# Patient Record
Sex: FEMALE | Race: WHITE | NOT HISPANIC OR LATINO | Employment: OTHER | URBAN - METROPOLITAN AREA
[De-identification: names, ages, dates, MRNs, and addresses within clinical notes are randomized per-mention and may not be internally consistent; named-entity substitution may affect disease eponyms.]

---

## 2019-01-05 ENCOUNTER — HOSPITAL ENCOUNTER (EMERGENCY)
Facility: HOSPITAL | Age: 29
Discharge: HOME | End: 2019-01-05
Attending: EMERGENCY MEDICINE
Payer: COMMERCIAL

## 2019-01-05 VITALS
HEART RATE: 95 BPM | RESPIRATION RATE: 22 BRPM | SYSTOLIC BLOOD PRESSURE: 132 MMHG | HEIGHT: 67 IN | TEMPERATURE: 99 F | OXYGEN SATURATION: 99 % | DIASTOLIC BLOOD PRESSURE: 83 MMHG | WEIGHT: 230 LBS | BODY MASS INDEX: 36.1 KG/M2

## 2019-01-05 DIAGNOSIS — G43.109 COMPLICATED MIGRAINE: Primary | ICD-10-CM

## 2019-01-05 PROBLEM — R20.0 NUMBNESS: Status: ACTIVE | Noted: 2019-01-05

## 2019-01-05 LAB
ALBUMIN SERPL BCP-MCNC: 4.1 G/DL
ALP SERPL-CCNC: 60 U/L
ALT SERPL W/O P-5'-P-CCNC: 67 U/L
ANION GAP SERPL CALC-SCNC: 10 MMOL/L
AST SERPL-CCNC: 42 U/L
B-HCG UR QL: NEGATIVE
BASOPHILS # BLD AUTO: 0.05 K/UL
BASOPHILS NFR BLD: 0.6 %
BILIRUB SERPL-MCNC: 0.8 MG/DL
BUN SERPL-MCNC: 13 MG/DL
CALCIUM SERPL-MCNC: 9.7 MG/DL
CHLORIDE SERPL-SCNC: 104 MMOL/L
CHOLEST SERPL-MCNC: 221 MG/DL
CHOLEST/HDLC SERPL: 4 {RATIO}
CO2 SERPL-SCNC: 22 MMOL/L
CREAT SERPL-MCNC: 0.9 MG/DL
CTP QC/QA: YES
DIFFERENTIAL METHOD: ABNORMAL
EOSINOPHIL # BLD AUTO: 0.2 K/UL
EOSINOPHIL NFR BLD: 2.8 %
ERYTHROCYTE [DISTWIDTH] IN BLOOD BY AUTOMATED COUNT: 12.6 %
EST. GFR  (AFRICAN AMERICAN): >60 ML/MIN/1.73 M^2
EST. GFR  (NON AFRICAN AMERICAN): >60 ML/MIN/1.73 M^2
GLUCOSE SERPL-MCNC: 94 MG/DL
HCT VFR BLD AUTO: 45.1 %
HDLC SERPL-MCNC: 55 MG/DL
HDLC SERPL: 24.9 %
HGB BLD-MCNC: 15.4 G/DL
IMM GRANULOCYTES # BLD AUTO: 0.02 K/UL
IMM GRANULOCYTES NFR BLD AUTO: 0.2 %
INR PPP: 1
LDLC SERPL CALC-MCNC: 141.8 MG/DL
LYMPHOCYTES # BLD AUTO: 3.4 K/UL
LYMPHOCYTES NFR BLD: 40.3 %
MCH RBC QN AUTO: 31.3 PG
MCHC RBC AUTO-ENTMCNC: 34.1 G/DL
MCV RBC AUTO: 92 FL
MONOCYTES # BLD AUTO: 0.8 K/UL
MONOCYTES NFR BLD: 9.1 %
NEUTROPHILS # BLD AUTO: 4 K/UL
NEUTROPHILS NFR BLD: 47 %
NONHDLC SERPL-MCNC: 166 MG/DL
NRBC BLD-RTO: 0 /100 WBC
PLATELET # BLD AUTO: 261 K/UL
PMV BLD AUTO: 10.4 FL
POCT GLUCOSE: 103 MG/DL (ref 70–110)
POTASSIUM SERPL-SCNC: 3.7 MMOL/L
PROT SERPL-MCNC: 7.9 G/DL
PROTHROMBIN TIME: 10.2 SEC
RBC # BLD AUTO: 4.92 M/UL
SODIUM SERPL-SCNC: 136 MMOL/L
TRIGL SERPL-MCNC: 121 MG/DL
TSH SERPL DL<=0.005 MIU/L-ACNC: 3.37 UIU/ML
WBC # BLD AUTO: 8.49 K/UL

## 2019-01-05 PROCEDURE — 81025 URINE PREGNANCY TEST: CPT | Performed by: EMERGENCY MEDICINE

## 2019-01-05 PROCEDURE — 80061 LIPID PANEL: CPT

## 2019-01-05 PROCEDURE — 99285 EMERGENCY DEPT VISIT HI MDM: CPT | Mod: 25

## 2019-01-05 PROCEDURE — 85025 COMPLETE CBC W/AUTO DIFF WBC: CPT

## 2019-01-05 PROCEDURE — 85610 PROTHROMBIN TIME: CPT

## 2019-01-05 PROCEDURE — 99285 PR EMERGENCY DEPT VISIT,LEVEL V: ICD-10-PCS | Mod: ,,, | Performed by: EMERGENCY MEDICINE

## 2019-01-05 PROCEDURE — 63600175 PHARM REV CODE 636 W HCPCS: Performed by: EMERGENCY MEDICINE

## 2019-01-05 PROCEDURE — 99285 EMERGENCY DEPT VISIT HI MDM: CPT | Mod: ,,, | Performed by: EMERGENCY MEDICINE

## 2019-01-05 PROCEDURE — 96375 TX/PRO/DX INJ NEW DRUG ADDON: CPT

## 2019-01-05 PROCEDURE — 99284 EMERGENCY DEPT VISIT MOD MDM: CPT | Mod: ,,, | Performed by: PSYCHIATRY & NEUROLOGY

## 2019-01-05 PROCEDURE — 96374 THER/PROPH/DIAG INJ IV PUSH: CPT

## 2019-01-05 PROCEDURE — 82962 GLUCOSE BLOOD TEST: CPT

## 2019-01-05 PROCEDURE — 93010 ELECTROCARDIOGRAM REPORT: CPT | Mod: ,,, | Performed by: INTERNAL MEDICINE

## 2019-01-05 PROCEDURE — 93010 EKG 12-LEAD: ICD-10-PCS | Mod: ,,, | Performed by: INTERNAL MEDICINE

## 2019-01-05 PROCEDURE — 82565 ASSAY OF CREATININE: CPT

## 2019-01-05 PROCEDURE — 80053 COMPREHEN METABOLIC PANEL: CPT

## 2019-01-05 PROCEDURE — 84443 ASSAY THYROID STIM HORMONE: CPT

## 2019-01-05 PROCEDURE — 99284 PR EMERGENCY DEPT VISIT,LEVEL IV: ICD-10-PCS | Mod: ,,, | Performed by: PSYCHIATRY & NEUROLOGY

## 2019-01-05 PROCEDURE — 93005 ELECTROCARDIOGRAM TRACING: CPT

## 2019-01-05 PROCEDURE — 25000003 PHARM REV CODE 250: Performed by: EMERGENCY MEDICINE

## 2019-01-05 PROCEDURE — 99900035 HC TECH TIME PER 15 MIN (STAT)

## 2019-01-05 RX ORDER — ACETAMINOPHEN 325 MG/1
650 TABLET ORAL
Status: COMPLETED | OUTPATIENT
Start: 2019-01-05 | End: 2019-01-05

## 2019-01-05 RX ORDER — NAPROXEN 500 MG/1
500 TABLET ORAL 2 TIMES DAILY PRN
Qty: 14 TABLET | Refills: 0 | Status: SHIPPED | OUTPATIENT
Start: 2019-01-05 | End: 2019-01-12

## 2019-01-05 RX ORDER — ONDANSETRON 2 MG/ML
4 INJECTION INTRAMUSCULAR; INTRAVENOUS
Status: COMPLETED | OUTPATIENT
Start: 2019-01-05 | End: 2019-01-05

## 2019-01-05 RX ORDER — PROCHLORPERAZINE EDISYLATE 5 MG/ML
10 INJECTION INTRAMUSCULAR; INTRAVENOUS ONCE
Status: COMPLETED | OUTPATIENT
Start: 2019-01-05 | End: 2019-01-05

## 2019-01-05 RX ADMIN — SODIUM CHLORIDE 250 ML: 0.9 INJECTION, SOLUTION INTRAVENOUS at 01:01

## 2019-01-05 RX ADMIN — PROCHLORPERAZINE EDISYLATE 10 MG: 5 INJECTION INTRAMUSCULAR; INTRAVENOUS at 01:01

## 2019-01-05 RX ADMIN — ACETAMINOPHEN 650 MG: 325 TABLET ORAL at 02:01

## 2019-01-05 RX ADMIN — ONDANSETRON 4 MG: 2 INJECTION INTRAMUSCULAR; INTRAVENOUS at 02:01

## 2019-01-05 NOTE — ED NOTES
Report to dr arzate, no stroke code at this time, no weakness or drift to arms legs, face symmetrical

## 2019-01-05 NOTE — ED NOTES
Pt. To CT with RN on cardiac, o2, and bp monitor. Stuttering noted but otherwise a&ox4. States headache at this time, no blurred vision. No motor deficits, states numbness started on rue this afternoon but states it is now on lue. Tachypneic at 22breaths/min, otherwise vss and wnl.

## 2019-01-05 NOTE — ED NOTES
Pt. Transported back to ED bed 17 with RN on cardiac and o2 monitor. No acute distress noted at this time.

## 2019-01-05 NOTE — ED NOTES
Pt. Remains in MRI with RN and stroke PA on cardiac, o2, and bp monitor. Tolerating well. Will continue to monitor frequently.

## 2019-01-05 NOTE — ED PROVIDER NOTES
Encounter Date: 1/5/2019    SCRIBE #1 NOTE: I, Rylie Stokes, am scribing for, and in the presence of,  Dr. Andujar. I have scribed the following portions of the note - Other sections scribed: HPI, ROS, PE.       History     Chief Complaint   Patient presents with    Numbness     r hand numbness, rside of face,  now moved to my L side while in uber, crying, hx anxiety, headaches for 2 weeks, hx ptsd     Time patient was seen by the provider: 11:40 AM      Ms. Yi is a 28 y.o. female with history of anxiety, PTSD, asthma, colon polyps and depression who presents to the ED with a complaint of numbness. Patient reports right sided facial numbness, right hand numbness and dfficulty with speech onset 1 hour PTA, now with numbness in left hand. Friend reports she has had migraines with bending over and lying on her abdomen for the past several days. Has some associated nausea without vomiting. Has frontal and left sided headache that has been present for 1 week. No new stressors today. Denies fevers, chills, chest pain, SOB, abd pain.      The history is provided by the patient and medical records.     Review of patient's allergies indicates:   Allergen Reactions    Penicillins Hives     Past Medical History:   Diagnosis Date    Anxiety     Asthma     Colon polyps 2016    Depression     PTSD (post-traumatic stress disorder)      Past Surgical History:   Procedure Laterality Date    colonpolyp removal       No family history on file.  Social History     Tobacco Use    Smoking status: Not on file   Substance Use Topics    Alcohol use: Not on file    Drug use: Not on file     Review of Systems   Constitutional: Negative.    HENT: Negative.    Respiratory: Negative.    Cardiovascular: Negative.    Gastrointestinal: Negative.    Genitourinary: Negative.    Musculoskeletal: Negative.    Neurological: Positive for speech difficulty, numbness (hands b/l, right facial) and headaches.   All other systems  reviewed and are negative.      Physical Exam     Initial Vitals [01/05/19 1127]   BP Pulse Resp Temp SpO2   (!) 151/84 100 18 98.6 °F (37 °C) 99 %      MAP       --         Physical Exam    Nursing note and vitals reviewed.  Constitutional: She appears well-developed and well-nourished. She is not diaphoretic. No distress.   HENT:   Head: Normocephalic and atraumatic.   Right Ear: External ear normal.   Left Ear: External ear normal.   Nose: Nose normal.   Mouth/Throat: Oropharynx is clear and moist.   Eyes: Conjunctivae and EOM are normal. Pupils are equal, round, and reactive to light.   No nystagmus.   Neck: Neck supple.   Cardiovascular: Normal rate, regular rhythm, normal heart sounds and intact distal pulses.   Pulmonary/Chest: Breath sounds normal. No respiratory distress. She has no wheezes. She has no rhonchi. She has no rales.   Abdominal: Soft. She exhibits no distension. There is no tenderness.   Neurological: She is alert and oriented to person, place, and time. No cranial nerve deficit. GCS score is 15. GCS eye subscore is 4. GCS verbal subscore is 5. GCS motor subscore is 6.   Tremulous on exam. Normal BUE strength. Has wavering BLE legs but normal tone. Distal sensation intact to light touch. Has stuttered speech with possible expressive aphasia.   Skin: Skin is warm. Capillary refill takes less than 2 seconds. No rash noted.   Psychiatric: She has a normal mood and affect.         ED Course   Procedures  Labs Reviewed   CBC W/ AUTO DIFFERENTIAL - Abnormal; Notable for the following components:       Result Value    MCH 31.3 (*)     All other components within normal limits   COMPREHENSIVE METABOLIC PANEL - Abnormal; Notable for the following components:    CO2 22 (*)     AST 42 (*)     ALT 67 (*)     All other components within normal limits   LIPID PANEL - Abnormal; Notable for the following components:    Cholesterol 221 (*)     All other components within normal limits   PROTIME-INR   TSH   POCT  URINE PREGNANCY   POCT GLUCOSE   ISTAT PROCEDURE   ISTAT CREATININE     EKG Readings: (Independently Interpreted)   NSR rate 88. Normal intervals. No ischemic changes. No STEMI.       Imaging Results          X-Ray Chest AP Portable (Final result)  Result time 01/05/19 14:04:48    Final result by Yoav Eldridge MD (01/05/19 14:04:48)                 Impression:      No acute abnormality.      Electronically signed by: Yoav Eldridge MD  Date:    01/05/2019  Time:    14:04             Narrative:    EXAMINATION:  XR CHEST AP PORTABLE    CLINICAL HISTORY:  Stroke;    TECHNIQUE:  Single frontal portable view of the chest was performed.    COMPARISON:  None    FINDINGS:  Support devices: None    The lungs are clear, with normal appearance of pulmonary vasculature and no pleural effusion or pneumothorax.    The cardiac silhouette is normal in size. The hilar and mediastinal contours are unremarkable.    Bones are intact.                               MRA Neck without contrast (Final result)  Result time 01/05/19 12:52:53    Final result by Yoav Eldridge MD (01/05/19 12:52:53)                 Impression:      No acute intracranial abnormality. No significant arterial abnormalities.    Preliminary findings of MRI of the brain were discussed with Dayanara Garnica prior to completion of examination.      Electronically signed by: Yoav Eldridge MD  Date:    01/05/2019  Time:    12:52             Narrative:    EXAMINATION:  MRI BRAIN WITHOUT CONTRAST; MRA BRAIN WITHOUT CONTRAST; MRA NECK WITHOUT CONTRAST    CLINICAL HISTORY:  aphasia;; stroke;; Stroke;    TECHNIQUE:  Routine MRI brain without contrast, noncontrast MRA of the brain, and noncontrast MRA of the neck. Multiplanar multisequence MR imaging of the brain was performed without contrast. Noncontrast 3D time-of-flight intracranial MR angiography was performed through the [place] [facility] [place] and noncontrast 2D time-of-flight MR angiography of the neck was performed with MIP  reformatting.    COMPARISON:  Head CT same date    FINDINGS:  Intracranial Compartment: Ventricles and sulci are normal in size for age without evidence of hydrocephalus. No extra-axial blood or fluid collections. The brain parenchyma appears normal. No mass lesion, acute hemorrhage, edema, or acute infarct.    Aorta: Normal 3 vessel arch.    Extracranial carotid circulation: No hemodynamically significant stenosis, aneurysmal dilatation, or dissection.    Extracranial vertebral circulation: No hemodynamically significant stenosis, aneurysmal dilatation, or dissection.  Right vertebral artery is dominant.  Left vertebral artery is not well delineated at the V3/V4 junction, likely related to flow artifact.    Intracranial Arteries: No focal high-grade stenosis, occlusion, or aneurysm.  There is predominantly fetal supply to the right posterior cerebral artery territory via the right posterior communicating artery with a hypoplastic or aplastic P1 segment of the posterior cerebral artery, normal congenital variant.  Non dominant left vertebral artery terminates in plica, normal congenital variant.    Skull/Extracranial Contents (limited evaluation): Bone marrow signal intensity is normal.                               MRA Brain without contrast (Final result)  Result time 01/05/19 12:52:53    Final result by Yoav Eldridge MD (01/05/19 12:52:53)                 Impression:      No acute intracranial abnormality. No significant arterial abnormalities.    Preliminary findings of MRI of the brain were discussed with Dayanara Garnica prior to completion of examination.      Electronically signed by: Yoav Eldridge MD  Date:    01/05/2019  Time:    12:52             Narrative:    EXAMINATION:  MRI BRAIN WITHOUT CONTRAST; MRA BRAIN WITHOUT CONTRAST; MRA NECK WITHOUT CONTRAST    CLINICAL HISTORY:  aphasia;; stroke;; Stroke;    TECHNIQUE:  Routine MRI brain without contrast, noncontrast MRA of the brain, and noncontrast MRA of the  neck. Multiplanar multisequence MR imaging of the brain was performed without contrast. Noncontrast 3D time-of-flight intracranial MR angiography was performed through the [place] [facility] [place] and noncontrast 2D time-of-flight MR angiography of the neck was performed with MIP reformatting.    COMPARISON:  Head CT same date    FINDINGS:  Intracranial Compartment: Ventricles and sulci are normal in size for age without evidence of hydrocephalus. No extra-axial blood or fluid collections. The brain parenchyma appears normal. No mass lesion, acute hemorrhage, edema, or acute infarct.    Aorta: Normal 3 vessel arch.    Extracranial carotid circulation: No hemodynamically significant stenosis, aneurysmal dilatation, or dissection.    Extracranial vertebral circulation: No hemodynamically significant stenosis, aneurysmal dilatation, or dissection.  Right vertebral artery is dominant.  Left vertebral artery is not well delineated at the V3/V4 junction, likely related to flow artifact.    Intracranial Arteries: No focal high-grade stenosis, occlusion, or aneurysm.  There is predominantly fetal supply to the right posterior cerebral artery territory via the right posterior communicating artery with a hypoplastic or aplastic P1 segment of the posterior cerebral artery, normal congenital variant.  Non dominant left vertebral artery terminates in plica, normal congenital variant.    Skull/Extracranial Contents (limited evaluation): Bone marrow signal intensity is normal.                               MRI Brain Without Contrast (Final result)  Result time 01/05/19 12:52:53    Final result by Yoav Eldridge MD (01/05/19 12:52:53)                 Impression:      No acute intracranial abnormality. No significant arterial abnormalities.    Preliminary findings of MRI of the brain were discussed with Dayanara Garnica prior to completion of examination.      Electronically signed by: Yoav Eldridge MD  Date:    01/05/2019  Time:    12:52              Narrative:    EXAMINATION:  MRI BRAIN WITHOUT CONTRAST; MRA BRAIN WITHOUT CONTRAST; MRA NECK WITHOUT CONTRAST    CLINICAL HISTORY:  aphasia;; stroke;; Stroke;    TECHNIQUE:  Routine MRI brain without contrast, noncontrast MRA of the brain, and noncontrast MRA of the neck. Multiplanar multisequence MR imaging of the brain was performed without contrast. Noncontrast 3D time-of-flight intracranial MR angiography was performed through the [place] [facility] [place] and noncontrast 2D time-of-flight MR angiography of the neck was performed with MIP reformatting.    COMPARISON:  Head CT same date    FINDINGS:  Intracranial Compartment: Ventricles and sulci are normal in size for age without evidence of hydrocephalus. No extra-axial blood or fluid collections. The brain parenchyma appears normal. No mass lesion, acute hemorrhage, edema, or acute infarct.    Aorta: Normal 3 vessel arch.    Extracranial carotid circulation: No hemodynamically significant stenosis, aneurysmal dilatation, or dissection.    Extracranial vertebral circulation: No hemodynamically significant stenosis, aneurysmal dilatation, or dissection.  Right vertebral artery is dominant.  Left vertebral artery is not well delineated at the V3/V4 junction, likely related to flow artifact.    Intracranial Arteries: No focal high-grade stenosis, occlusion, or aneurysm.  There is predominantly fetal supply to the right posterior cerebral artery territory via the right posterior communicating artery with a hypoplastic or aplastic P1 segment of the posterior cerebral artery, normal congenital variant.  Non dominant left vertebral artery terminates in plica, normal congenital variant.    Skull/Extracranial Contents (limited evaluation): Bone marrow signal intensity is normal.                               CT Head Without Contrast (Final result)  Result time 01/05/19 12:29:21    Final result by Yoav Eldridge MD (01/05/19 12:29:21)                 Impression:      No acute  abnormality.    Electronically signed by resident: Kelsi Ba  Date:    01/05/2019  Time:    12:00    Electronically signed by: Yoav Eldridge MD  Date:    01/05/2019  Time:    12:29             Narrative:    EXAMINATION:  CT HEAD WITHOUT CONTRAST    CLINICAL HISTORY:  aphasia;    TECHNIQUE:  Low dose axial CT images obtained throughout the head without intravenous contrast. Sagittal and coronal reconstructions were performed.    COMPARISON:  None.    FINDINGS:  Intracranial compartment: Ventricles and sulci are normal in size for age without evidence of hydrocephalus. No extra-axial blood or fluid collections. The brain parenchyma appears normal. No parenchymal mass, hemorrhage, edema or major vascular distribution infarct.    Skull/extracranial contents (limited evaluation): No fracture. Mastoid air cells and paranasal sinuses are essentially clear.                                 Medical Decision Making:   History:   I obtained history from: someone other than patient.  Old Medical Records: I decided to obtain old medical records.  Clinical Tests:   Lab Tests: Ordered and Reviewed  Radiological Study: Reviewed and Ordered  Medical Tests: Ordered and Reviewed  ED Management:  Vitals normal. Afebrile. Given acute onset of neuro symptoms with expressive aphasia, code stroke paged on arrival. However, clinically I feel the patient is more likely suffering from complicated migraine. Discussed with stroke team who evaluated; they recommended NCCTH as well as stat MRI brain. Lab work ordered as well. Compazine given for headache and nausea.    Imaging negative for acute stroke. Stroke team also does not feel patient suffering from TIA, but more likely complicated migraine. Lab work viewed; grossly WNL w/o actionable values.     Patient had resolution of symptoms after tylenol and compazine. Has some dizziness on standing intermittently, so orthostatic vitals ordered; normal. Advised pt to f/u w/ primary care for  further management. Referral to neuro placed for headaqche management. Stable for discharge at this time. Return precautions discussed.   Other:   I have discussed this case with another health care provider.       <> Summary of the Discussion: Vascular Neurology            Scribe Attestation:   Scribe #1: I performed the above scribed service and the documentation accurately describes the services I performed. I attest to the accuracy of the note.               Clinical Impression:   The encounter diagnosis was Complicated migraine.      Disposition:   Disposition: Discharged  Condition: Stable                        Paul Andujar MD  01/07/19 7828

## 2019-01-05 NOTE — ED TRIAGE NOTES
ARIES at 10:30. Pt began experiencing changes in speech accompanied by right sided facial numbness and right hand numbness. Upon arrival to the ED, pt is tearful, anxious and has changes in speech. Pt has a history of PTSD.

## 2019-01-06 NOTE — HPI
"28 year old female with history of colon polyps, asthma, anxiety, depression and ptsd presents to the ED from play rehearsal for symptoms of numnbess and speech difficulty. The patient reported right sided headache and face numbness and then subsequent left sided numbess of the hand. She reports further development of speech changes. She reports no vision changes, or difficulty walking and endorses no weakness. (+) nausea, (-) vomiting.     The patient reports history of "migraines" and panic attacks in the past in the past but states shes never had symptoms as above associated with them.   No treatment prior to ED and no history of similar.   "

## 2019-01-06 NOTE — ASSESSMENT & PLAN NOTE
Patient numbness and stuttering in the ED   On exam - patient without true aphasia, appears very anxious and tremulous with headache.  ddx - complex migraine vs panic attack     Patient seen in CT and escorted to MRI - no acute changes to suggest stroke    Symptoms not consistent with vascular territory injury.   Discussed with Dr Jackson and Dr Andujar.     Patient with only stroke risk factor as obesity.   No further management or work up per stroke team.

## 2019-01-06 NOTE — SUBJECTIVE & OBJECTIVE
Past Medical History:   Diagnosis Date    Anxiety     Asthma     Colon polyps 2016    Depression     PTSD (post-traumatic stress disorder)      Past Surgical History:   Procedure Laterality Date    colonpolyp removal       No family history on file.  Social History     Tobacco Use    Smoking status: Not on file   Substance Use Topics    Alcohol use: Not on file    Drug use: Not on file     Review of patient's allergies indicates:   Allergen Reactions    Penicillins Hives       Medications: I have reviewed the current medication administration record.      (Not in a hospital admission)    Review of Systems   Constitutional: Positive for diaphoresis. Negative for fatigue and fever.   HENT: Negative for drooling.    Eyes: Negative for visual disturbance.   Respiratory: Negative for shortness of breath.    Cardiovascular: Negative for leg swelling.   Gastrointestinal: Positive for nausea. Negative for vomiting.   Musculoskeletal: Negative for gait problem.   Skin: Negative for color change.   Allergic/Immunologic: Negative for immunocompromised state.   Neurological: Positive for tremors, numbness and headaches.   Hematological: Does not bruise/bleed easily.   Psychiatric/Behavioral: The patient is nervous/anxious.      Objective:     Vital Signs (Most Recent):  Temp: 98.6 °F (37 °C) (01/05/19 1127)  Pulse: 95 (01/05/19 1504)  Resp: (!) 22 (01/05/19 1200)  BP: 132/83 (01/05/19 1504)  SpO2: 99 % (01/05/19 1200)    Vital Signs Range (Last 24H):  Temp:  [98.6 °F (37 °C)]   Pulse:  []   Resp:  [18-22]   BP: (106-151)/(73-94)   SpO2:  [99 %]     Physical Exam   Constitutional: She appears well-developed and well-nourished.   HENT:   Head: Normocephalic and atraumatic.   Cardiovascular: Normal rate.   Pulmonary/Chest: Effort normal.   Abdominal: Soft. She exhibits no distension.   Musculoskeletal: Normal range of motion. She exhibits no edema or deformity.   Neurological: She is alert.   Skin: Skin is warm  and dry.   Psychiatric:   Anxious, tearful, tremulous   Nursing note and vitals reviewed.      Neurological Exam:   LOC: alert  Attention Span: Good   Language: No aphasia  Articulation: no dysarthria, stuttering, better with single words as opposed to sentances  Orientation: Person, Place, Time   Visual Fields: Full  EOM (CN III, IV, VI): Full/intact  Pupils (CN II, III): PERRL  Facial Sensation (CN V): Facial sensory loss - Right subjective   Facial Movement (CN VII): Symmetric facial expression    Gag Reflex: present  Motor: Arm left  Normal 5/5  Leg left  Normal 5/5  Arm right  Normal 5/5  Leg right Normal 5/5  Cebellar: No evidence of appendicular or axial ataxia  Sensation: subjective left sided arm numbness   Tone: Normal tone throughout      Laboratory:  CMP:   Recent Labs   Lab 01/05/19  1151   CALCIUM 9.7   ALBUMIN 4.1   PROT 7.9      K 3.7   CO2 22*      BUN 13   CREATININE 0.9   ALKPHOS 60   ALT 67*   AST 42*   BILITOT 0.8     CBC:   Recent Labs   Lab 01/05/19  1151   WBC 8.49   RBC 4.92   HGB 15.4   HCT 45.1      MCV 92   MCH 31.3*   MCHC 34.1     Lipid Panel:   Recent Labs   Lab 01/05/19  1151   CHOL 221*   LDLCALC 141.8   HDL 55   TRIG 121     Coagulation:   Recent Labs   Lab 01/05/19  1151   INR 1.0     Hgb A1C: No results for input(s): HGBA1C in the last 168 hours.  TSH:   Recent Labs   Lab 01/05/19  1151   TSH 3.370       Diagnostic Results:      Brain imaging:    CT head 1/5/19  No acute abnormality.    MRI 1/5/19  No acute intracranial abnormality. No significant arterial abnormalities.    Preliminary findings of MRI of the brain were discussed with Dayanara Garnica prior to completion of examination.

## 2019-01-06 NOTE — CONSULTS
[facility]  Vascular Neurology  Comprehensive Stroke Center  Consult Note    Consults   Aphasia, numbness   Assessment/Plan:     Patient is a 28 y.o. year old female with:    Numbness    Patient alternating numbness and stuttering in the ED   On exam - patient without true aphasia, appears very anxious and tremulous with headache.  ddx - complex migraine vs panic attack     Patient seen in CT and escorted to MRI - no acute changes to suggest stroke    Symptoms not consistent with vascular territory injury.   Discussed with Dr Jackson and Dr Andujar.     Patient with only stroke risk factor as obesity.   No further management or work up per stroke team.            STROKE DOCUMENTATION     Acute Stroke Times   Last Known Normal Date: 01/05/19  Last Known Normal Time: 1000  Symptom Onset Date: 01/05/19  Symptom Onset Time: 1030  Stroke Team Called Date: 01/05/19  Stroke Team Called Time: 1145  Stroke Team Arrival Date: 01/05/19  Stroke Team Arrival Time: 1148  CT Interpretation Time: 1200    NIH Scale:  1a. Level of Consciousness: 0-->Alert, keenly responsive  1b. LOC Questions: 0-->Answers both questions correctly  1c. LOC Commands: 0-->Performs both tasks correctly  2. Best Gaze: 0-->Normal  3. Visual: 0-->No visual loss  4. Facial Palsy: 0-->Normal symmetrical movements  5a. Motor Arm, Left: 0-->No drift, limb holds 90 (or 45) degrees for full 10 secs  5b. Motor Arm, Right: 0-->No drift, limb holds 90 (or 45) degrees for full 10 secs  6a. Motor Leg, Left: 0-->No drift, leg holds 30 degree position for full 5 secs  6b. Motor Leg, Right: 0-->No drift, leg holds 30 degree position for full 5 secs  7. Limb Ataxia: 0-->Absent  8. Sensory: 1-->Mild-to-moderate sensory loss, patient feels pinprick is less sharp or is dull on the affected side, or there is a loss of superficial pain with pinprick, but patient is aware of being touched  9. Best Language: 0-->No aphasia, normal  10. Dysarthria:  "1-->Mild-to-moderate dysarthria, patient slurs at least some words and, at worst, can be understood with some difficulty  11. Extinction and Inattention (formerly Neglect): 0-->No abnormality  Total (NIH Stroke Scale): 2    Modified Barron Score: 0  Emerson Coma Scale:    ABCD2 Score:    [id number] Score:   HAS -BLED Score:   ICH Score:   Hunt & Galicia Classification:       Thrombolysis Candidate? No, Strong suspicion for stroke mimic or alternative diagnosis       Interventional Revascularization Candidate?   Is the patient eligible for mechanical endovascular reperfusion (BEV)?  No; No large vessel occlusion      Hemorrhagic change of an Ischemic Stroke: Does this patient have an ischemic stroke with hemorrhagic changes? No     Subjective:     History of Present Illness:  28 year old female with history of colon polyps, asthma, anxiety, depression and ptsd presents to the ED from play rehearsal for symptoms of numnbess and speech difficulty. The patient reported right sided headache and face numbness and then subsequent left sided numbess of the hand. She reports further development of speech changes. She reports no vision changes, or difficulty walking and endorses no weakness. (+) nausea, (-) vomiting.     The patient reports history of "migraines" and panic attacks in the past in the past but states shes never had symptoms as above associated with them.   No treatment prior to ED and no history of similar.         Past Medical History:   Diagnosis Date    Anxiety     Asthma     Colon polyps 2016    Depression     PTSD (post-traumatic stress disorder)      Past Surgical History:   Procedure Laterality Date    colonpolyp removal       No family history on file.  Social History     Tobacco Use    Smoking status: Not on file   Substance Use Topics    Alcohol use: Not on file    Drug use: Not on file     Review of patient's allergies indicates:   Allergen Reactions    Penicillins Hives       Medications: I " have reviewed the current medication administration record.      (Not in a hospital admission)    Review of Systems   Constitutional: Positive for diaphoresis. Negative for fatigue and fever.   HENT: Negative for drooling.    Eyes: Negative for visual disturbance.   Respiratory: Negative for shortness of breath.    Cardiovascular: Negative for leg swelling.   Gastrointestinal: Positive for nausea. Negative for vomiting.   Musculoskeletal: Negative for gait problem.   Skin: Negative for color change.   Allergic/Immunologic: Negative for immunocompromised state.   Neurological: Positive for tremors, numbness and headaches.   Hematological: Does not bruise/bleed easily.   Psychiatric/Behavioral: The patient is nervous/anxious.      Objective:     Vital Signs (Most Recent):  Temp: 98.6 °F (37 °C) (01/05/19 1127)  Pulse: 95 (01/05/19 1504)  Resp: (!) 22 (01/05/19 1200)  BP: 132/83 (01/05/19 1504)  SpO2: 99 % (01/05/19 1200)    Vital Signs Range (Last 24H):  Temp:  [98.6 °F (37 °C)]   Pulse:  []   Resp:  [18-22]   BP: (106-151)/(73-94)   SpO2:  [99 %]     Physical Exam   Constitutional: She appears well-developed and well-nourished.   HENT:   Head: Normocephalic and atraumatic.   Cardiovascular: Normal rate.   Pulmonary/Chest: Effort normal.   Abdominal: Soft. She exhibits no distension.   Musculoskeletal: Normal range of motion. She exhibits no edema or deformity.   Neurological: She is alert.   Skin: Skin is warm and dry.   Psychiatric:   Anxious, tearful, tremulous   Nursing note and vitals reviewed.      Neurological Exam:   LOC: alert  Attention Span: Good   Language: No aphasia  Articulation: no dysarthria, stuttering, better with single words as opposed to sentances  Orientation: Person, Place, Time   Visual Fields: Full  EOM (CN III, IV, VI): Full/intact  Pupils (CN II, III): PERRL  Facial Sensation (CN V): Facial sensory loss - Right subjective   Facial Movement (CN VII): Symmetric facial expression    Gag  Reflex: present  Motor: Arm left  Normal 5/5  Leg left  Normal 5/5  Arm right  Normal 5/5  Leg right Normal 5/5  Cebellar: No evidence of appendicular or axial ataxia  Sensation: subjective left sided arm numbness   Tone: Normal tone throughout      Laboratory:  CMP:   Recent Labs   Lab 01/05/19  1151   CALCIUM 9.7   ALBUMIN 4.1   PROT 7.9      K 3.7   CO2 22*      BUN 13   CREATININE 0.9   ALKPHOS 60   ALT 67*   AST 42*   BILITOT 0.8     CBC:   Recent Labs   Lab 01/05/19  1151   WBC 8.49   RBC 4.92   HGB 15.4   HCT 45.1      MCV 92   MCH 31.3*   MCHC 34.1     Lipid Panel:   Recent Labs   Lab 01/05/19  1151   CHOL 221*   LDLCALC 141.8   HDL 55   TRIG 121     Coagulation:   Recent Labs   Lab 01/05/19  1151   INR 1.0     Hgb A1C: No results for input(s): HGBA1C in the last 168 hours.  TSH:   Recent Labs   Lab 01/05/19  1151   TSH 3.370       Diagnostic Results:      Brain imaging:    CT head 1/5/19  No acute abnormality.    MRI 1/5/19  No acute intracranial abnormality. No significant arterial abnormalities.    Preliminary findings of MRI of the brain were discussed with Dayanara Garnica prior to completion of examination.          SAMI Donohue  Comprehensive Stroke Center  Department of Vascular Neurology   [facility]

## 2019-01-07 LAB
CREAT SERPL-MCNC: 0.9 MG/DL (ref 0.5–1.4)
POC PTINR: 1.1 (ref 0.9–1.2)
POC PTWBT: 12.7 SEC (ref 9.7–14.3)
SAMPLE: NORMAL
SAMPLE: NORMAL

## 2019-01-08 ENCOUNTER — OFFICE VISIT (OUTPATIENT)
Dept: NEUROLOGY | Facility: CLINIC | Age: 29
End: 2019-01-08
Payer: COMMERCIAL

## 2019-01-08 VITALS
BODY MASS INDEX: 36.1 KG/M2 | WEIGHT: 230 LBS | DIASTOLIC BLOOD PRESSURE: 91 MMHG | SYSTOLIC BLOOD PRESSURE: 110 MMHG | HEIGHT: 67 IN | HEART RATE: 92 BPM

## 2019-01-08 DIAGNOSIS — R51.9 CHRONIC NONINTRACTABLE HEADACHE, UNSPECIFIED HEADACHE TYPE: Primary | ICD-10-CM

## 2019-01-08 DIAGNOSIS — G89.29 CHRONIC NONINTRACTABLE HEADACHE, UNSPECIFIED HEADACHE TYPE: Primary | ICD-10-CM

## 2019-01-08 DIAGNOSIS — G43.109 MIGRAINE WITH AURA AND WITHOUT STATUS MIGRAINOSUS, NOT INTRACTABLE: ICD-10-CM

## 2019-01-08 PROCEDURE — 99999 PR PBB SHADOW E&M-EST. PATIENT-LVL III: ICD-10-PCS | Mod: PBBFAC,,, | Performed by: PSYCHIATRY & NEUROLOGY

## 2019-01-08 PROCEDURE — 99214 PR OFFICE/OUTPT VISIT, EST, LEVL IV, 30-39 MIN: ICD-10-PCS | Mod: S$GLB,,, | Performed by: PSYCHIATRY & NEUROLOGY

## 2019-01-08 PROCEDURE — 3008F BODY MASS INDEX DOCD: CPT | Mod: CPTII,S$GLB,, | Performed by: PSYCHIATRY & NEUROLOGY

## 2019-01-08 PROCEDURE — 3008F PR BODY MASS INDEX (BMI) DOCUMENTED: ICD-10-PCS | Mod: CPTII,S$GLB,, | Performed by: PSYCHIATRY & NEUROLOGY

## 2019-01-08 PROCEDURE — 99214 OFFICE O/P EST MOD 30 MIN: CPT | Mod: S$GLB,,, | Performed by: PSYCHIATRY & NEUROLOGY

## 2019-01-08 PROCEDURE — 99999 PR PBB SHADOW E&M-EST. PATIENT-LVL III: CPT | Mod: PBBFAC,,, | Performed by: PSYCHIATRY & NEUROLOGY

## 2019-01-08 RX ORDER — ONDANSETRON 4 MG/1
4 TABLET, FILM COATED ORAL EVERY 12 HOURS PRN
Qty: 10 TABLET | Refills: 0 | Status: ON HOLD | OUTPATIENT
Start: 2019-01-08 | End: 2019-02-05

## 2019-01-08 RX ORDER — METHYLPREDNISOLONE 4 MG/1
TABLET ORAL
Qty: 1 PACKAGE | Refills: 0 | Status: SHIPPED | OUTPATIENT
Start: 2019-01-08 | End: 2019-01-29

## 2019-01-08 NOTE — PROGRESS NOTES
"  Tracey Yi is a 28 y.o. year old female that  presents with   Chief Complaint   Patient presents with    Pain    Migraine    Headache    Vomiting    Fatigue    Dizziness    Numbness   .     HPI:  Ms Yi has anxiety, depression, PTSD, asthma, colon polyps and migraines.  As per review of the available electronic medical records, she presented to the ED with a complaint of numbness and HA and was evaluated by our stroke team: " presents to the ED from play rehearsal for symptoms of numnbess and speech difficulty. The patient reported right sided headache and face numbness and then subsequent left sided numbess of the hand. She reports further development of speech changes. She reports no vision changes, or difficulty walking and endorses no weakness. (+) nausea, (-) vomiting. The patient reports history of "migraines" and panic attacks in the past in the past but states shes never had symptoms as above associated with them. Patient alternating numbness and stuttering in the ED   On exam - patient without true aphasia, appears very anxious and tremulous with headache.  ddx - complex migraine vs panic attack   MRI/MRA brain as well as MRA neck done in the ED were unremarkable.  She comes in today stating that she remains having daily migraines with recurrent nausea and vomiting, and fuzzy vision but no associated focal weakness, visual loss, language or speech impairment.  Describes HA that occur also when she is lying in her stomach.  Tylenol doesn't really help.  Snores but no witnessed apneas or reported excessive daytime sleepiness. TSH normal. No excessive use of analgesics or caffeinated substances. No head/neck trauma, no infection.  Of importance, she indicated that she used to get very sporadic migraine attacks but for unknown reasons the migraine has been daily since this past Saturday.             Past Medical History:   Diagnosis Date    Anxiety     Asthma     Colon polyps " 2016    Depression     PTSD (post-traumatic stress disorder)      Social History     Socioeconomic History    Marital status:      Spouse name: Not on file    Number of children: Not on file    Years of education: Not on file    Highest education level: Not on file   Social Needs    Financial resource strain: Not on file    Food insecurity - worry: Not on file    Food insecurity - inability: Not on file    Transportation needs - medical: Not on file    Transportation needs - non-medical: Not on file   Occupational History    Not on file   Tobacco Use    Smoking status: Not on file   Substance and Sexual Activity    Alcohol use: Not on file    Drug use: Not on file    Sexual activity: Not on file   Other Topics Concern    Not on file   Social History Narrative    Not on file     Past Surgical History:   Procedure Laterality Date    colonpolyp removal       No family history on file.        Review of Systems  General ROS: negative for chills, fever or weight loss  Psychological ROS: negative for hallucination, depression or suicidal ideation  Ophthalmic ROS: negative for blurry vision, photophobia or eye pain  ENT ROS: negative for epistaxis, sore throat or rhinorrhea  Respiratory ROS: no cough, shortness of breath, or wheezing  Cardiovascular ROS: no chest pain or dyspnea on exertion  Gastrointestinal ROS: no abdominal pain, change in bowel habits, or black/ bloody stools  Genito-Urinary ROS: no dysuria, trouble voiding, or hematuria  Musculoskeletal ROS: negative for gait disturbance or muscular weakness  Neurological ROS: no syncope or seizures; no ataxia  Dermatological ROS: negative for pruritis, rash and jaundice      Physical Exam:  There were no vitals taken for this visit.  General appearance: alert, cooperative, no distress  Constitutional:Oriented to person, place, and time.appears well-developed and well-nourished.   HEENT: Normocephalic, atraumatic, neck symmetrical, no nasal  discharge   Eyes: conjunctivae/corneas clear, PERRL, EOM's intact  Lungs: clear to auscultation bilaterally, no dullness to percussion bilaterally  Heart: regular rate and rhythm without rub; no displacement of the PMI   Abdomen: soft, non-tender; bowel sounds normoactive; no organomegaly  Extremities: extremities symmetric; no clubbing, cyanosis, or edema  Integument: Skin color, texture, turgor normal; no rashes; hair distrubution normal  Neurologic:  Mental status: alert and awake, oriented x 4, comprehension, naming, and repetition intact. No right to left confusion. Performs serial 7's without difficulty .No dysarthria.  CN 2-12: pupils 4 mm bilaterally, reactive to light. Fundi without papilledema. Visual fields full to confrontation. EOM full without nystagmus. Face sensation normal in all distributions. Face symmetric. Hearing grossly intact. Palate elevates well. Tongue midline without atrophy or fasciculations.  Motor: 5/5 all over  Sensory: intact in all modalities.  DTR's: 2+ all over.  Plantars: no tested.  Coordination: finger to nose and heel-knee-shin intact.  Gait: no ataxia or bradykinesia  Psychiatric: no pressured speech; normal affect; no evidence of impaired cognition     LABS:    Complete Blood Count  Lab Results   Component Value Date    RBC 4.92 01/05/2019    HGB 15.4 01/05/2019    HCT 45.1 01/05/2019    MCV 92 01/05/2019    MCH 31.3 (H) 01/05/2019    MCHC 34.1 01/05/2019    RDW 12.6 01/05/2019     01/05/2019    MPV 10.4 01/05/2019    GRAN 4.0 01/05/2019    GRAN 47.0 01/05/2019    LYMPH 3.4 01/05/2019    LYMPH 40.3 01/05/2019    MONO 0.8 01/05/2019    MONO 9.1 01/05/2019    EOS 0.2 01/05/2019    BASO 0.05 01/05/2019    EOSINOPHIL 2.8 01/05/2019    BASOPHIL 0.6 01/05/2019    DIFFMETHOD Automated 01/05/2019       Comprehensive Metabolic Panel  Lab Results   Component Value Date    GLU 94 01/05/2019    BUN 13 01/05/2019    CREATININE 0.9 01/05/2019     01/05/2019    K 3.7  01/05/2019     01/05/2019    PROT 7.9 01/05/2019    ALBUMIN 4.1 01/05/2019    BILITOT 0.8 01/05/2019    AST 42 (H) 01/05/2019    ALKPHOS 60 01/05/2019    CO2 22 (L) 01/05/2019    ALT 67 (H) 01/05/2019    ANIONGAP 10 01/05/2019    EGFRNONAA >60.0 01/05/2019    ESTGFRAFRICA >60.0 01/05/2019       TSH  Lab Results   Component Value Date    TSH 3.370 01/05/2019         Assessment:  29 y/o with anxiety, depression, PTSD, asthma, colon polyps and episodic migraines.   Normal neuro-exam. Negative MRI/MRA brain as well as MRA neck.  Unclear reason for change in her migraine pattern.  She is concern about hemiplegic migraine (HM) but I told her that at this time and point she does not meet criteria for HM.          No diagnosis found.  There were no encounter diagnoses.      Plan:  1) Migraine: trial of medrol dose osbaldo to stop current migraine cycle.  Will ultimately need migraine preventive and abortive treatment.  2) Nausea, likely part of ongoing HA: prescribed zofran.  2) Anxiety  3) Depression  4) PTSD  5) Asthma  No orders of the defined types were placed in this encounter.          Bib Mary MD

## 2019-01-08 NOTE — LETTER
January 8, 2019      Paul Andujar MD  [address] [place] [address]           [facility]  [facility]  [facility] [address]  Phone: [phone]          Patient: Tracey Yi   MR Number: [id number]   YOB: 1990   Date of Visit: 1/8/2019       Dear Dr. Paul Andujar:    Thank you for referring Tracey Yi to me for evaluation. Attached you will find relevant portions of my assessment and plan of care.    If you have questions, please do not hesitate to call me. I look forward to following Tracey Yi along with you.    Sincerely,    Bib Mary MD    Enclosure  CC:  No Recipients    If you would like to receive this communication electronically, please contact [email or web address] or [phone] to request more information on [employer] Link access.    For providers and/or their staff who would like to refer a patient to [facility], please contact us through our one-stop-shop provider referral line, [facility], at [phone].    If you feel you have received this communication in error or would no longer like to receive these types of communications, please e-mail [email or web address]

## 2019-01-15 ENCOUNTER — TELEPHONE (OUTPATIENT)
Dept: NEUROLOGY | Facility: CLINIC | Age: 29
End: 2019-01-15

## 2019-01-15 NOTE — TELEPHONE ENCOUNTER
----- Message from Cali Ellis sent at 1/15/2019  4:38 PM CST -----  Contact: Pt. [phone]  Needs Advice    Reason for call: The patient would like to speak to someone regarding scheduling her appointment. This is her 5th time calling and still haven't received any call backs.  Please contact the patient to discuss further.          Communication Preference: PHONE     Additional Information:

## 2019-01-15 NOTE — TELEPHONE ENCOUNTER
Patient was just seen on 01/08/2019 called patient twice on yesterday not sure what patient is needing to be scheduled but left message for patient to return call. Did not receive any other calls from patient that I am aware of.

## 2019-01-16 RX ORDER — TIZANIDINE HYDROCHLORIDE 4 MG/1
4 CAPSULE, GELATIN COATED ORAL 2 TIMES DAILY
Qty: 10 CAPSULE | Refills: 0 | Status: SHIPPED | OUTPATIENT
Start: 2019-01-16 | End: 2019-01-26

## 2019-01-16 NOTE — TELEPHONE ENCOUNTER
Spoke with patient and patient wants to try the infusion for 2 or 3 days to see if it will help break the cycle. Patient won't be able to start until Monday 01/21/2019. Patient also wants to try something else to help with pain and something to help with nausea until Tuesday when the infusion begins.

## 2019-01-16 NOTE — TELEPHONE ENCOUNTER
----- Message from Shruti Ballard MA sent at 1/15/2019  3:27 PM CST -----  Contact: pt       ----- Message -----  From: Moni Bonilla  Sent: 1/15/2019  11:10 AM  To: [facility] Staff    Pt called to speak with nurse about medication pt stated medicine is not working.            Pt callback number [phone]

## 2019-01-16 NOTE — TELEPHONE ENCOUNTER
Called patient to offer 5 days of therapy infusion to control headache, left message for patient to return call asap so we can get this started.

## 2019-01-16 NOTE — TELEPHONE ENCOUNTER
----- Message from Tong Lindo sent at 1/16/2019 10:59 AM CST -----  Patient Returning Call from [facility]    Who Left Message for Patient: Dr. Mary  Communication Preference: [phone]  Additional Information:

## 2019-01-17 ENCOUNTER — TELEPHONE (OUTPATIENT)
Dept: NEUROLOGY | Facility: CLINIC | Age: 29
End: 2019-01-17

## 2019-01-17 NOTE — TELEPHONE ENCOUNTER
----- Message from Karyna Dimas sent at 1/17/2019  8:44 AM CST -----  Contact: self @ [phone]  Pts  would like to speak with Dr Mary to see when he can bring her in.  Would also like to see if he can prescription something for her headaches.  Pls call.

## 2019-01-17 NOTE — TELEPHONE ENCOUNTER
Spoke to  and explained everything was already explained to wife on yesterday and medicine was called in for patient for headache relief and nausea. Patient will do infusion starting on Monday.

## 2019-01-21 ENCOUNTER — TELEPHONE (OUTPATIENT)
Dept: NEUROLOGY | Facility: CLINIC | Age: 29
End: 2019-01-21

## 2019-01-21 PROBLEM — G43.911 INTRACTABLE MIGRAINE WITH STATUS MIGRAINOSUS: Status: ACTIVE | Noted: 2019-01-21

## 2019-01-21 NOTE — TELEPHONE ENCOUNTER
Spoke with  and informed him again that admitting will call with the details once everything is put in and a bed is ready for patient for 3 day infusion

## 2019-01-21 NOTE — TELEPHONE ENCOUNTER
----- Message from Tong Lindo sent at 1/21/2019  8:03 AM CST -----  Needs Advice    Reason for call: Mr. Yi is calling to schedule pt's infusion for today        Communication Preference: Mr. Yi () @ [phone]    Additional Information:

## 2019-01-21 NOTE — PLAN OF CARE
[facility] admissions ONLY: Please call extension [phone] upon patient arrival to floor for Hospital Medicine admit team assignment and for additional admit orders for the patient. Do not page the attending, staff physician or Advanced Practice Provider with the patient on arrival (may not be in-house at the time of arrival). Call back or wait to leave beeper number when prompted.      Direct Admit Clinic Acceptance Note       Patients name/MRN: Tracey Yi, MRN [id number]     Clinic Physician or Mid-Level provider giving report: Dr. Bib Mary (Neurology Clinic [facility])  Contact number: [phone]    Date of Acceptance: 2/4/19    Accepting Physician for admission to hospital: Riya Calzada MD    Reason for direct admit to hospital: Needs IV Depakote, Toradol and Solumedrol for 3 days for intractable migraine; Failed outpatient management     Report from Physician/Mid-Level Provider/HPI: 27 y/o female with PTSD, anxiety, depression, migraine headaches and asthma was recently seen in [facility] ER on 1/7 with right sided headache and right facial and left hand numbness. Patient had stroke evaluation with normal MRI/MRA of brain and diagnosed with complex migraine and sent home. Patient followed up with Dr. Mary in Neurology clinic at [facility] on 1/8 and sent home with Medrol dosepak and Zofran to treat headache and nausea related to headache. Headache persists despite outpatient management, so Dr. Mary originally wanted the patient directly admitted from home on 1/21/19 for IV medication treatment for intractable migraine (Depakote, Solumedrol and Toradol) for 3 days and treat persistent nausea.  Unfortunately, she never showed up for the direct admit mediated at that time by Dr. Calzada.      LABS:    No results found for this or any previous visit (from the past 24 hour(s)).     Imaging: See EPIC    To Do List upon arrival:    Consult General Neurology to assist in headache management  Just  routine labs  No radiographs  General iv fluids and PRN Benadryl 25mg iv q6 hours scheduled  As per Dr. Mary:   IV Depakote 500 mg IV daily for 3 days    IV Solumedrol 1 gram IV daily for 3 days   IV Toradol 30 mg every 6 hours x 3 days   Zofran prn for nausea    GALLITO Smith MD  [facility] Medicine  Patient Flow Center/   [phone]

## 2019-01-28 ENCOUNTER — TELEPHONE (OUTPATIENT)
Dept: NEUROLOGY | Facility: CLINIC | Age: 29
End: 2019-01-28

## 2019-01-28 NOTE — TELEPHONE ENCOUNTER
Can we place new orders for Monday, patient missed call and didn't go to admit. Patient stated she was waiting on another call. Patient wants to try again for Monday 02/04/2019

## 2019-01-28 NOTE — TELEPHONE ENCOUNTER
----- Message from Stefany Watson sent at 1/28/2019  9:40 AM CST -----  Contact: pt   Please call pt  at [phone]    Patient would like to discuss future treatment for her headaches  3rd call request    Thank you

## 2019-01-31 ENCOUNTER — TELEPHONE (OUTPATIENT)
Dept: NEUROSURGERY | Facility: CLINIC | Age: 29
End: 2019-01-31

## 2019-01-31 NOTE — TELEPHONE ENCOUNTER
----- Message from Farnaz Parks sent at 1/31/2019  2:37 PM CST -----  Contact: Dyana (mother) @ [phone]  Caller says the patient is having tunnel vision now and they were told to call if the patient had in changes in her condition. Patient is scheduled for a procedure with Dr. Mora on 2/4. Please call.

## 2019-02-04 ENCOUNTER — NURSE TRIAGE (OUTPATIENT)
Dept: ADMINISTRATIVE | Facility: CLINIC | Age: 29
End: 2019-02-04

## 2019-02-04 ENCOUNTER — HOSPITAL ENCOUNTER (INPATIENT)
Facility: HOSPITAL | Age: 29
LOS: 1 days | Discharge: HOME | DRG: 103 | End: 2019-02-05
Attending: INTERNAL MEDICINE | Admitting: INTERNAL MEDICINE
Payer: COMMERCIAL

## 2019-02-04 ENCOUNTER — TELEPHONE (OUTPATIENT)
Dept: NEUROLOGY | Facility: CLINIC | Age: 29
End: 2019-02-04

## 2019-02-04 DIAGNOSIS — G43.109 MIGRAINE WITH AURA AND WITHOUT STATUS MIGRAINOSUS, NOT INTRACTABLE: ICD-10-CM

## 2019-02-04 DIAGNOSIS — R20.0 NUMBNESS: Primary | ICD-10-CM

## 2019-02-04 DIAGNOSIS — R51.9 HEADACHE: ICD-10-CM

## 2019-02-04 DIAGNOSIS — G43.911 INTRACTABLE MIGRAINE WITH STATUS MIGRAINOSUS: ICD-10-CM

## 2019-02-04 LAB
ALBUMIN SERPL BCP-MCNC: 3.9 G/DL
ALP SERPL-CCNC: 57 U/L
ALT SERPL W/O P-5'-P-CCNC: 53 U/L
ANION GAP SERPL CALC-SCNC: 7 MMOL/L
AST SERPL-CCNC: 27 U/L
B-HCG UR QL: NEGATIVE
BASOPHILS # BLD AUTO: 0.04 K/UL
BASOPHILS NFR BLD: 0.6 %
BILIRUB SERPL-MCNC: 0.8 MG/DL
BUN SERPL-MCNC: 12 MG/DL
CALCIUM SERPL-MCNC: 9.5 MG/DL
CHLORIDE SERPL-SCNC: 107 MMOL/L
CO2 SERPL-SCNC: 23 MMOL/L
CREAT SERPL-MCNC: 0.7 MG/DL
DIFFERENTIAL METHOD: ABNORMAL
EOSINOPHIL # BLD AUTO: 0.1 K/UL
EOSINOPHIL NFR BLD: 2.1 %
ERYTHROCYTE [DISTWIDTH] IN BLOOD BY AUTOMATED COUNT: 12.4 %
EST. GFR  (AFRICAN AMERICAN): >60 ML/MIN/1.73 M^2
EST. GFR  (NON AFRICAN AMERICAN): >60 ML/MIN/1.73 M^2
GLUCOSE SERPL-MCNC: 89 MG/DL
HCT VFR BLD AUTO: 41.4 %
HGB BLD-MCNC: 14.2 G/DL
IMM GRANULOCYTES # BLD AUTO: 0.02 K/UL
IMM GRANULOCYTES NFR BLD AUTO: 0.3 %
LYMPHOCYTES # BLD AUTO: 2.1 K/UL
LYMPHOCYTES NFR BLD: 34.6 %
MAGNESIUM SERPL-MCNC: 2.2 MG/DL
MCH RBC QN AUTO: 31.3 PG
MCHC RBC AUTO-ENTMCNC: 34.3 G/DL
MCV RBC AUTO: 91 FL
MONOCYTES # BLD AUTO: 0.5 K/UL
MONOCYTES NFR BLD: 7.6 %
NEUTROPHILS # BLD AUTO: 3.4 K/UL
NEUTROPHILS NFR BLD: 54.8 %
NRBC BLD-RTO: 0 /100 WBC
PHOSPHATE SERPL-MCNC: 3 MG/DL
PLATELET # BLD AUTO: 211 K/UL
PMV BLD AUTO: 11.4 FL
POTASSIUM SERPL-SCNC: 4 MMOL/L
PROT SERPL-MCNC: 7.2 G/DL
RBC # BLD AUTO: 4.54 M/UL
SODIUM SERPL-SCNC: 137 MMOL/L
WBC # BLD AUTO: 6.18 K/UL

## 2019-02-04 PROCEDURE — 93005 ELECTROCARDIOGRAM TRACING: CPT

## 2019-02-04 PROCEDURE — 36415 COLL VENOUS BLD VENIPUNCTURE: CPT

## 2019-02-04 PROCEDURE — 99223 1ST HOSP IP/OBS HIGH 75: CPT | Mod: ,,, | Performed by: STUDENT IN AN ORGANIZED HEALTH CARE EDUCATION/TRAINING PROGRAM

## 2019-02-04 PROCEDURE — 25000003 PHARM REV CODE 250: Performed by: STUDENT IN AN ORGANIZED HEALTH CARE EDUCATION/TRAINING PROGRAM

## 2019-02-04 PROCEDURE — 80053 COMPREHEN METABOLIC PANEL: CPT

## 2019-02-04 PROCEDURE — 93010 EKG 12-LEAD: ICD-10-PCS | Mod: ,,, | Performed by: INTERNAL MEDICINE

## 2019-02-04 PROCEDURE — 84100 ASSAY OF PHOSPHORUS: CPT

## 2019-02-04 PROCEDURE — 93010 ELECTROCARDIOGRAM REPORT: CPT | Mod: ,,, | Performed by: INTERNAL MEDICINE

## 2019-02-04 PROCEDURE — 83735 ASSAY OF MAGNESIUM: CPT

## 2019-02-04 PROCEDURE — 85025 COMPLETE CBC W/AUTO DIFF WBC: CPT

## 2019-02-04 PROCEDURE — 11000001 HC ACUTE MED/SURG PRIVATE ROOM

## 2019-02-04 PROCEDURE — 99223 PR INITIAL HOSPITAL CARE,LEVL III: ICD-10-PCS | Mod: ,,, | Performed by: STUDENT IN AN ORGANIZED HEALTH CARE EDUCATION/TRAINING PROGRAM

## 2019-02-04 PROCEDURE — 81025 URINE PREGNANCY TEST: CPT

## 2019-02-04 PROCEDURE — 63600175 PHARM REV CODE 636 W HCPCS: Performed by: STUDENT IN AN ORGANIZED HEALTH CARE EDUCATION/TRAINING PROGRAM

## 2019-02-04 RX ORDER — KETOROLAC TROMETHAMINE 30 MG/ML
30 INJECTION, SOLUTION INTRAMUSCULAR; INTRAVENOUS EVERY 6 HOURS
Status: DISCONTINUED | OUTPATIENT
Start: 2019-02-04 | End: 2019-02-04

## 2019-02-04 RX ORDER — ONDANSETRON 2 MG/ML
4 INJECTION INTRAMUSCULAR; INTRAVENOUS EVERY 8 HOURS PRN
Status: DISCONTINUED | OUTPATIENT
Start: 2019-02-04 | End: 2019-02-05 | Stop reason: HOSPADM

## 2019-02-04 RX ORDER — DIPHENHYDRAMINE HCL 25 MG
25 CAPSULE ORAL EVERY 6 HOURS PRN
Status: DISCONTINUED | OUTPATIENT
Start: 2019-02-04 | End: 2019-02-05 | Stop reason: HOSPADM

## 2019-02-04 RX ADMIN — VALPROATE SODIUM 500 MG: 100 INJECTION, SOLUTION INTRAVENOUS at 05:02

## 2019-02-04 RX ADMIN — METHYLPREDNISOLONE SODIUM SUCCINATE: 1 INJECTION, POWDER, LYOPHILIZED, FOR SOLUTION INTRAMUSCULAR; INTRAVENOUS at 05:02

## 2019-02-04 RX ADMIN — DIPHENHYDRAMINE HYDROCHLORIDE 25 MG: 25 CAPSULE ORAL at 08:02

## 2019-02-04 RX ADMIN — ONDANSETRON 4 MG: 2 INJECTION INTRAMUSCULAR; INTRAVENOUS at 08:02

## 2019-02-04 NOTE — HPI
Ms. Yi is a 29 yo female with a hx of  anxiety, depression, asthma ansd PTSD who presents as a direct admit from neurologist Dr. Mary for intractable migraines. Patient reports a history of migraines since she was young but not as bad as it has been recently. Patient presented to the ED, Jan 5th with severe migraines associated with right sided facial numbness, right hand numbness and difficulty with speech. Work up at that time including MRI brain was unremarkable, she was discharged with complex migraine with follow up with Dr. Mary outpatient. Patient states that she has been having migraines everyday since then, associated with light sensitivity and severe nausea > 5 times per day. The migraines are usually frontal and also associated with numbness, confusion and paralysis. She states that when the headache is at its worst, its a 10/10, she has difficulty speaking and blurry vision with halos. She reports a headache of 5/10 currently, she denies any paralysis or nausea at this time. She denies any recent illness or travel. Pt states she has a cat, 2 rabbits and a turtle at home. She denies a family history of migraines and a hx of seizures in her father. Pt denies any LOC, seizures, fever or chills.     Patient also reports concern over elevated LFT's AST/ALT 47/67 on last ED visit. She reports occasional alcohol use 3 drinks per per month, admits to occasional marijuana and denies any other drug use.

## 2019-02-04 NOTE — ASSESSMENT & PLAN NOTE
Patient with intractable migraines associated with paralysis, admitted directly her neurologist Dr. Mary  Per Dr. Mary reccs  · IV Depakote 500 mg IV daily for 3 days   · IV Solumedrol 1 gram IV daily for 3 days  · IV Toradol 30 mg every 6 hours x 3 days  · Zofran prn for nausea  · Benadryl 25mg Q6H prn  - Holding Toradol as patient reports allergies with Toradol, will appreciate reccs  - F/u CBC& CMP  - Neurochecks Q4H  - Current headache 5/10, will continue to monitor.

## 2019-02-04 NOTE — SUBJECTIVE & OBJECTIVE
Review of Systems   Constitutional: Negative for activity change, chills and fever.   HENT: Negative for ear pain and sore throat.    Eyes: Positive for photophobia and visual disturbance. Negative for pain and redness.   Respiratory: Negative for cough, chest tightness and shortness of breath.    Cardiovascular: Negative for chest pain, palpitations and leg swelling.   Gastrointestinal: Positive for nausea. Negative for abdominal distention, abdominal pain, diarrhea and vomiting.   Genitourinary: Negative for difficulty urinating and dysuria.   Musculoskeletal: Negative for arthralgias, back pain and joint swelling.   Skin: Negative for color change and pallor.   Neurological: Positive for speech difficulty (with migraines), weakness, numbness (with migraines) and headaches.   Psychiatric/Behavioral: Positive for confusion. Negative for behavioral problems.     Objective:     Vital Signs (Most Recent):    Vital Signs (24h Range):           There is no height or weight on file to calculate BMI.  No intake or output data in the 24 hours ending 02/04/19 1648   Physical Exam   Constitutional: She is oriented to person, place, and time. She appears well-developed and well-nourished. No distress.   HENT:   Head: Normocephalic and atraumatic.   Right Ear: External ear normal.   Left Ear: External ear normal.   Eyes: Conjunctivae are normal. Pupils are equal, round, and reactive to light. Right eye exhibits no discharge. Left eye exhibits no discharge. No scleral icterus.   Neck: Normal range of motion.   Cardiovascular: Normal rate, regular rhythm, normal heart sounds and intact distal pulses.   No murmur heard.  Pulmonary/Chest: Effort normal and breath sounds normal. No respiratory distress. She has no wheezes.   Abdominal: Soft. Bowel sounds are normal. There is no tenderness.   Musculoskeletal: Normal range of motion. She exhibits no edema or tenderness.   Neurological: She is alert and oriented to person, place, and  time. She has normal strength. No sensory deficit. She exhibits normal muscle tone.   Skin: Skin is warm. No rash noted. She is not diaphoretic.   Psychiatric: Her behavior is normal.       Significant Labs:   CBC:   Recent Labs   Lab 02/04/19  1608   WBC 6.18   HGB 14.2   HCT 41.4        CMP:   Recent Labs   Lab 02/04/19  1608      K 4.0      CO2 23   GLU 89   BUN 12   CREATININE 0.7   CALCIUM 9.5   PROT 7.2   ALBUMIN 3.9   BILITOT 0.8   ALKPHOS 57   AST 27   ALT 53*   ANIONGAP 7*   EGFRNONAA >60.0       Significant Imaging: I have reviewed all pertinent imaging results/findings within the past 24 hours.

## 2019-02-04 NOTE — TELEPHONE ENCOUNTER
Reason for Disposition   Nursing judgment    Protocols used: ST NO PROTOCOL CALL: SICK ADULT-A-[place]    Patient is calling for information regarding 3 day infusion for migraines. Spoke to patient's . Instructions from noted on 1/21/19 in chart given. Patient advised to report to admissions for infusion.

## 2019-02-04 NOTE — TELEPHONE ENCOUNTER
Called and spoke with patients  and informed him to report to admitting the orders were in and finalized by Dr. Mary

## 2019-02-04 NOTE — H&P
[facility] Hospital Medicine  History & Physical    Patient Name: Tracey Yi  MRN: [id number]  Admission Date: 2/4/2019  Attending Physician: Bernardo Howe MD   Primary Care Provider: Batsheva Kerr MD    [facility] Medicine Team: [facility] HOSP MED 5 Zainab Kaiser DO     Patient information was obtained from patient, past medical records and ER records.     Subjective:     Principal Problem:<principal problem not specified>    Chief Complaint: No chief complaint on file.       HPI: Ms. Yi is a 29 yo female with a hx of  anxiety, depression, asthma ansd PTSD who presents as a direct admit from neurologist Dr. Mary for intractable migraines. Patient reports a history of migraines since she was young but not as bad as it has been recently. Patient presented to the ED, Jan 5th with severe migraines associated with right sided facial numbness, right hand numbness and difficulty with speech. Work up at that time including MRI brain was unremarkable, she was discharged with complex migraine with follow up with Dr. Mary outpatient. Patient states that she has been having migraines everyday since then, associated with light sensitivity and severe nausea > 5 times per day. The migraines are usually frontal and also associated with numbness, confusion and paralysis. She states that when the headache is at its worst, its a 10/10, she has difficulty speaking and blurry vision with halos. She reports a headache of 5/10 currently, she denies any paralysis or nausea at this time. She denies any recent illness or travel. Pt states she has a cat, 2 rabbits and a turtle at home. She denies a family history of migraines and a hx of seizures in her father. Pt denies any LOC, seizures, fever or chills.     Patient also reports concern over elevated LFT's AST/ALT 47/67 on last ED visit. She reports occasional alcohol use 3 drinks per per month, admits to occasional marijuana and denies any other  drug use.    Review of Systems   Constitutional: Negative for activity change, chills and fever.   HENT: Negative for ear pain and sore throat.    Eyes: Positive for photophobia and visual disturbance. Negative for pain and redness.   Respiratory: Negative for cough, chest tightness and shortness of breath.    Cardiovascular: Negative for chest pain, palpitations and leg swelling.   Gastrointestinal: Positive for nausea. Negative for abdominal distention, abdominal pain, diarrhea and vomiting.   Genitourinary: Negative for difficulty urinating and dysuria.   Musculoskeletal: Negative for arthralgias, back pain and joint swelling.   Skin: Negative for color change and pallor.   Neurological: Positive for speech difficulty (with migraines), weakness, numbness (with migraines) and headaches.   Psychiatric/Behavioral: Positive for confusion. Negative for behavioral problems.     Objective:     Vital Signs (Most Recent):    Vital Signs (24h Range):           There is no height or weight on file to calculate BMI.  No intake or output data in the 24 hours ending 02/04/19 1648   Physical Exam   Constitutional: She is oriented to person, place, and time. She appears well-developed and well-nourished. No distress.   HENT:   Head: Normocephalic and atraumatic.   Right Ear: External ear normal.   Left Ear: External ear normal.   Eyes: Conjunctivae are normal. Pupils are equal, round, and reactive to light. Right eye exhibits no discharge. Left eye exhibits no discharge. No scleral icterus.   Neck: Normal range of motion.   Cardiovascular: Normal rate, regular rhythm, normal heart sounds and intact distal pulses.   No murmur heard.  Pulmonary/Chest: Effort normal and breath sounds normal. No respiratory distress. She has no wheezes.   Abdominal: Soft. Bowel sounds are normal. There is no tenderness.   Musculoskeletal: Normal range of motion. She exhibits no edema or tenderness.   Neurological: She is alert and oriented to  person, place, and time. She has normal strength. No sensory deficit. She exhibits normal muscle tone.   Skin: Skin is warm. No rash noted. She is not diaphoretic.   Psychiatric: Her behavior is normal.       Significant Labs:   CBC:   Recent Labs   Lab 02/04/19  1608   WBC 6.18   HGB 14.2   HCT 41.4        CMP:   Recent Labs   Lab 02/04/19  1608      K 4.0      CO2 23   GLU 89   BUN 12   CREATININE 0.7   CALCIUM 9.5   PROT 7.2   ALBUMIN 3.9   BILITOT 0.8   ALKPHOS 57   AST 27   ALT 53*   ANIONGAP 7*   EGFRNONAA >60.0       Significant Imaging: I have reviewed all pertinent imaging results/findings within the past 24 hours.    Assessment/Plan:     Intractable migraine with status migrainosus    Patient with intractable migraines associated with paralysis, admitted directly her neurologist Dr. Mary  Per Dr. Mary reccs  · IV Depakote 500 mg IV daily for 3 days   · IV Solumedrol 1 gram IV daily for 3 days  · IV Toradol 30 mg every 6 hours x 3 days  · Zofran prn for nausea  · Benadryl 25mg Q6H prn  - Holding Toradol as patient reports allergies with Toradol, will appreciate reccs  - F/u CBC& CMP  - Neurochecks Q4H  - Current headache 5/10, will continue to monitor.         VTE Risk Mitigation (From admission, onward)    None             Zainab Kaiser DO  Department of Hospital Medicine   [facility]

## 2019-02-05 VITALS
WEIGHT: 256 LBS | BODY MASS INDEX: 40.18 KG/M2 | TEMPERATURE: 98 F | SYSTOLIC BLOOD PRESSURE: 144 MMHG | DIASTOLIC BLOOD PRESSURE: 81 MMHG | HEIGHT: 67 IN | OXYGEN SATURATION: 96 % | HEART RATE: 99 BPM | RESPIRATION RATE: 18 BRPM

## 2019-02-05 PROBLEM — G47.9 SLEEP DISTURBANCE: Status: ACTIVE | Noted: 2019-02-05

## 2019-02-05 PROBLEM — F43.10 PTSD (POST-TRAUMATIC STRESS DISORDER): Status: ACTIVE | Noted: 2019-02-05

## 2019-02-05 PROCEDURE — 99233 PR SUBSEQUENT HOSPITAL CARE,LEVL III: ICD-10-PCS | Mod: ,,, | Performed by: PSYCHIATRY & NEUROLOGY

## 2019-02-05 PROCEDURE — 63600175 PHARM REV CODE 636 W HCPCS: Performed by: STUDENT IN AN ORGANIZED HEALTH CARE EDUCATION/TRAINING PROGRAM

## 2019-02-05 PROCEDURE — 99238 PR HOSPITAL DISCHARGE DAY,<30 MIN: ICD-10-PCS | Mod: ,,, | Performed by: INTERNAL MEDICINE

## 2019-02-05 PROCEDURE — 99238 HOSP IP/OBS DSCHRG MGMT 30/<: CPT | Mod: ,,, | Performed by: INTERNAL MEDICINE

## 2019-02-05 PROCEDURE — 25000003 PHARM REV CODE 250: Performed by: STUDENT IN AN ORGANIZED HEALTH CARE EDUCATION/TRAINING PROGRAM

## 2019-02-05 PROCEDURE — 99233 SBSQ HOSP IP/OBS HIGH 50: CPT | Mod: ,,, | Performed by: PSYCHIATRY & NEUROLOGY

## 2019-02-05 RX ORDER — PRAZOSIN HYDROCHLORIDE 2 MG/1
2 CAPSULE ORAL NIGHTLY
Qty: 30 CAPSULE | Refills: 1 | Status: SHIPPED | OUTPATIENT
Start: 2019-02-05 | End: 2020-02-05

## 2019-02-05 RX ORDER — FAMOTIDINE 20 MG/1
20 TABLET, FILM COATED ORAL 2 TIMES DAILY
Status: DISCONTINUED | OUTPATIENT
Start: 2019-02-05 | End: 2019-02-05 | Stop reason: HOSPADM

## 2019-02-05 RX ORDER — PROMETHAZINE HYDROCHLORIDE 6.25 MG/5ML
12.5 SYRUP ORAL EVERY 6 HOURS PRN
Status: DISCONTINUED | OUTPATIENT
Start: 2019-02-05 | End: 2019-02-05 | Stop reason: HOSPADM

## 2019-02-05 RX ORDER — PANTOPRAZOLE SODIUM 40 MG/1
40 TABLET, DELAYED RELEASE ORAL DAILY
Status: DISCONTINUED | OUTPATIENT
Start: 2019-02-05 | End: 2019-02-05

## 2019-02-05 RX ORDER — FAMOTIDINE 20 MG/1
20 TABLET, FILM COATED ORAL NIGHTLY PRN
Qty: 60 TABLET | Refills: 11 | Status: SHIPPED | OUTPATIENT
Start: 2019-02-05 | End: 2020-02-05

## 2019-02-05 RX ORDER — SUCRALFATE 1 G/1
1 TABLET ORAL
Status: DISCONTINUED | OUTPATIENT
Start: 2019-02-05 | End: 2019-02-05

## 2019-02-05 RX ORDER — SUMATRIPTAN 50 MG/1
50 TABLET, FILM COATED ORAL
Qty: 12 TABLET | Refills: 1 | Status: SHIPPED | OUTPATIENT
Start: 2019-02-05 | End: 2019-03-07

## 2019-02-05 RX ORDER — FAMOTIDINE 20 MG/1
20 TABLET, FILM COATED ORAL 2 TIMES DAILY
Qty: 60 TABLET | Refills: 11 | Status: SHIPPED | OUTPATIENT
Start: 2019-02-05 | End: 2019-02-05

## 2019-02-05 RX ORDER — LANOLIN ALCOHOL/MO/W.PET/CERES
400 CREAM (GRAM) TOPICAL 2 TIMES DAILY
Refills: 0
Start: 2019-02-05

## 2019-02-05 RX ADMIN — METHYLPREDNISOLONE SODIUM SUCCINATE: 1 INJECTION, POWDER, LYOPHILIZED, FOR SOLUTION INTRAMUSCULAR; INTRAVENOUS at 10:02

## 2019-02-05 RX ADMIN — PANTOPRAZOLE SODIUM 40 MG: 40 TABLET, DELAYED RELEASE ORAL at 09:02

## 2019-02-05 RX ADMIN — VALPROATE SODIUM 500 MG: 100 INJECTION, SOLUTION INTRAVENOUS at 09:02

## 2019-02-05 RX ADMIN — PROMETHAZINE HYDROCHLORIDE 6.25 MG: 25 INJECTION INTRAMUSCULAR; INTRAVENOUS at 12:02

## 2019-02-05 RX ADMIN — FAMOTIDINE 20 MG: 20 TABLET ORAL at 02:02

## 2019-02-05 NOTE — PHARMACY MED REC
"Admission Medication Reconciliation - Pharmacy Consult Note    The home medication history was taken by Helen Mendoza, Pharmacy Technician.  Based on information gathered and subsequent review by the clinical pharmacist, the items below may need attention.     You may go to "Admission" then "Reconcile Home Medications" tabs to review and/or act upon these items.     Potentially problematic discrepancies with current MAR  o Patient IS NOT taking the following which was ordered upon admit  o Ondansetron  - Admissions tab: ondansetron 4 mg po q12h PRN for nausea  - MAR: ondansetron 4 mg IV q8h PRN for nausea  - Patient likely to be discharged on sumatriptan, an adverse effect of sumatriptan is nausea, patient may need an antiemetic (patient reports ondansetron ODT does not control her nausea, potential option is promethazine 6.25 mg po q4h PRN for nausea, QTc on  2/4/19 456)    Please address this information as you see fit.  Feel free to contact us if you have any questions or require assistance.    Marlene Bains  [phone]                    .    .            "

## 2019-02-05 NOTE — CONSULTS
"[facility]  Neurology  Consult Note    Patient Name: Tracey Yi  MRN: [id number]  Admission Date: 2/4/2019  Hospital Length of Stay: 1 days  Code Status: Full Code   Attending Provider: Gill Echols MD   Consulting Provider: Ana La MD  Primary Care Physician: Batsheva Kerr MD  Principal Problem:Migraine with aura, not intractable    Inpatient consult to Neurology  Consult performed by: Ana La MD  Consult ordered by: Zainab Kaiser DO         Subjective:     Chief Complaint:  headaches     HPI:   Ms. Yi is a 27 yo F with anxiety, depression, and complex PTSD who presented as a direct admit from the neurology clinic (Dr. Mary) for treatment of severe headaches.  She describes multiple headache types, all of which began January 5, 2019.  Prior to last month, she had had occasional mild headaches, and some "migraines," but no headaches like the ones she is currently experiencing.  She has been having headaches every day for a month, but finds it hard to tease out what symptoms are associated with which headaches.  She gets some that are like little "dots" over her forehead, some that are pain behind both her eyes, in her nose, and on the roof of her mouth, and some that are more vague and generalized.  The worst headaches are the middle type, which occur approx 5x/week, and are associated with severe photophobia, mild phonophobia, nausea, and vomiting.  However, she has been having some nausea and vomiting at other times, as well.  She also gets symptoms of numbness and tingling "like Novocaine" to her face (bilateral forehead, roof of mouth, cheek) or entire arm.  There is no laterality to the paresthesias.  Sometimes, it effects the RUE, sometimes the lef; however, it often effects the entire face.  Occasionally, she has some blurred vision, which starts more peripherally, and then the blurriness comes more centrally and effects her entire vision.  " "Around her vision, she sees halos.  Within her vision, she sees white spots.  Per her report, it is unclear whether she ever has actual weakness associated with her symptoms; however, she was previously worked up for stroke (imaging wnl), with some concern for prior R facial droop and R hand weakness.  She occasionally experiences a "bad taste" in her mouth.  She also reports cognitive slowing, poor sleep, and significant forgetfulness.  She is currently experiencing minimal headache only; she is s/p 2 doses of Solumedrol 1g and Depakote 500mg - each of which were given last night, and then again this morning.       Past Medical History:   Diagnosis Date    Anxiety     Asthma     Colon polyps 2016    Depression     Migraine with aura, not intractable 1/8/2019    PTSD (post-traumatic stress disorder)        Past Surgical History:   Procedure Laterality Date    colonpolyp removal         Review of patient's allergies indicates:   Allergen Reactions    Penicillins Hives       Current Neurological Medications: Solumedrol, Depakote    No current facility-administered medications on file prior to encounter.      Current Outpatient Medications on File Prior to Encounter   Medication Sig    ondansetron (ZOFRAN) 4 MG tablet Take 1 tablet (4 mg total) by mouth every 12 (twelve) hours as needed for Nausea.     Family History     None        Tobacco Use    Smoking status: Never Smoker   Substance and Sexual Activity    Alcohol use: No     Frequency: Never    Drug use: No    Sexual activity: No     Review of Systems   Eyes: Positive for photophobia and visual disturbance.   Respiratory: Positive for cough. Negative for shortness of breath.    Cardiovascular: Positive for palpitations. Negative for chest pain.   Gastrointestinal: Positive for abdominal pain, constipation and vomiting.   Genitourinary: Negative for frequency and urgency.   Skin: Negative for rash and wound.   Neurological: Positive for facial " asymmetry (intermittent ?R facial droop associated with severe HA), speech difficulty (intermittent; describes troubble getting words out), numbness and headaches.   Psychiatric/Behavioral: Positive for confusion (intermittent).     Objective:     Vital Signs (Most Recent):  Temp: 97.9 °F (36.6 °C) (02/05/19 1114)  Pulse: 99 (02/05/19 1114)  Resp: 18 (02/05/19 1114)  BP: (!) 144/81 (02/05/19 1114)  SpO2: 96 % (02/05/19 1114) Vital Signs (24h Range):  Temp:  [97.8 °F (36.6 °C)-98.5 °F (36.9 °C)] 97.9 °F (36.6 °C)  Pulse:  [73-99] 99  Resp:  [16-20] 18  SpO2:  [94 %-97 %] 96 %  BP: (101-144)/(61-93) 144/81     Weight: 116.1 kg (256 lb)  Body mass index is 40.1 kg/m².    Physical Exam   Constitutional: She is oriented to person, place, and time. She appears well-developed and well-nourished. No distress.   HENT:   Head: Normocephalic and atraumatic.   Eyes: EOM are normal.   Pulmonary/Chest: Effort normal.   Musculoskeletal: Normal range of motion.   Neurological: She is alert and oriented to person, place, and time. She has a normal Finger-Nose-Finger Test and a normal Heel to Mcdonald Test.   Skin: Skin is warm and dry. She is not diaphoretic.   Psychiatric: Her speech is normal. Judgment normal.   Nursing note and vitals reviewed.      NEUROLOGICAL EXAMINATION:     MENTAL STATUS   Oriented to person, place, and time.   Attention: normal. Concentration: normal.   Speech: speech is normal   Level of consciousness: alert    CRANIAL NERVES     CN II   Visual fields full to confrontation.     CN III, IV, VI   Extraocular motions are normal.   Nystagmus: none     CN V   Facial sensation intact.     CN VII   Facial expression full, symmetric.     CN VIII   Hearing: intact    CN XI   CN XI normal.     CN XII   CN XII normal.     MOTOR EXAM   Muscle bulk: normal  Overall muscle tone: normal    Strength   Right biceps: 5/5  Left biceps: 5/5  Right triceps: 5/5  Left triceps: 5/5  Right quadriceps: 5/5  Left quadriceps: 5/5  Right  hamstrin/5  Left hamstrin/5    SENSORY EXAM   Light touch normal.     GAIT AND COORDINATION      Coordination   Finger to nose coordination: normal  Heel to shin coordination: normal    Tremor   Resting tremor: absent  Intention tremor: absent  Action tremor: absent      Significant Labs: All pertinent lab results from the past 24 hours have been reviewed.    Significant Imaging: I have reviewed and interpreted all pertinent imaging results/findings within the past 24 hours.    Assessment and Plan:     * Migraine with aura, not intractable    Greatly improved today    Recommend:  - Magnesium oxide 400mg bid for HA prevention  - Ok to cautiously start sumatriptan 50mg for abortive therapy, but no more than 2 doses/day or 3 total doses/week   If patient develops complicated migraine with hemiplegia, DC Imitrex; monitor closely given mikhail-paresthesias  Ok to DC home today from neurology perspective, with outpatient neuro f/uy in 2-4 weeks     PTSD (post-traumatic stress disorder)    With associated nightmares  Prazosin as above  Outpatient psych f/u     Sleep disturbance    With associated nightmares    Sleep hygiene:  - Avoid afternoon caffeine consumption; limit evening fluid intake  - Attempt to go to bed at a regular time every night  - Avoid electronics in the bedroom (tv, computer, cellphone)  - If difficulty falling asleep after 20 mins, get up and move to a different room, e.g. to read, and then come back to bed    Prazosin 1mg qhs x2-3 days, then increase to 2mg qhs - for PTSD-associated nightmares/sleep disorder  Outpatient f/u         VTE Risk Mitigation (From admission, onward)    None          Thank you for your consult.     Ana La MD  Neurology  [facility] [address]  [id number]

## 2019-02-05 NOTE — PLAN OF CARE
Problem: Adult Inpatient Plan of Care  Goal: Plan of Care Review  Outcome: Ongoing (interventions implemented as appropriate)  POC reviewed,stated understanding,required meds for nausea with relief,minimal relief of headache but pt stated she rested after phenergan iv.Reinforced fall precautions.

## 2019-02-05 NOTE — NURSING
Contacted IM-5. Pt c/o reflux d/t hx of GERD.  Requested med for reflux.  Provider will review chart.

## 2019-02-05 NOTE — DISCHARGE SUMMARY
[facility]  Discharge Summary     Patient ID:  Tracey Yi  [id number]  28 y.o.  1990    Admit date: 2/4/2019    Discharge Date and Time:  02/05/2019 1:34 PM    Admitting Physician: Riya Calzada MD     Discharge Provider: Zainab Kaiser    Reason for Admission: Intractable migraine with aura with status migrainosus [G43.111]  Intractable migraine with status migrainosus [G43.911]    Admission Condition: good    Procedures Performed: * No surgery found *    Hospital Course: Admitted to hospital medicine directly by Dr. Herman (Neurology) for intractable migraines.  Given IV cocktail of Depakote 500 mg IV, Solumedrol 1 gm and Zofran. Patient was supposed to receive IV toradol but reported having colitis hence inability to  take NSAIDs. Patients headache improved significantly, she reported  2/10 on discharge. Neurology was consulted, with recommendations for discharge- Imitrex 50mg, Magnesium oxide 400 mg BID and Prazosin 2mg QHS (for better sleep hygiene). Patient medically cleared for discharge.     Consults: Neurology    Review of Systems   Constitutional: Negative for activity change, chills and fever.   HENT: Negative for ear pain and sore throat.    Eyes: Negative for photophobia, pain, redness and visual disturbance.   Respiratory: Negative for cough, chest tightness and shortness of breath.    Cardiovascular: Negative for chest pain, palpitations and leg swelling.   Gastrointestinal: Positive for nausea. Negative for abdominal distention, abdominal pain, diarrhea and vomiting.   Genitourinary: Negative for difficulty urinating and dysuria.   Musculoskeletal: Negative for arthralgias, back pain and joint swelling.   Skin: Negative for color change and pallor.   Neurological: Positive for headaches (3/10). Negative for speech difficulty (with migraines), weakness and numbness (with migraines).   Psychiatric/Behavioral: Negative for behavioral problems and  confusion      Significant Diagnostic Studies: labs:   Recent Labs   Lab 02/04/19  1608   WBC 6.18   HGB 14.2   HCT 41.4         CMP:       Recent Labs   Lab 02/04/19  1608      K 4.0      CO2 23   GLU 89   BUN 12   CREATININE 0.7   CALCIUM 9.5   PROT 7.2   ALBUMIN 3.9   BILITOT 0.8   ALKPHOS 57   AST 27   ALT 53*   ANIONGAP 7*   EGFRNONAA >60.0           Final Diagnoses:    Principal Problem: <principal problem not specified>   Secondary Diagnoses:   Active Hospital Problems    Diagnosis  POA    Intractable migraine with status migrainosus [G43.911]  Yes      Resolved Hospital Problems   No resolved problems to display.       Discharged Condition: good    Discharge Exam:  Physical Exam   Constitutional: She is oriented to person, place, and time. She appears well-developed and well-nourished. No distress.   HENT:   Head: Normocephalic and atraumatic.   Right Ear: External ear normal.   Left Ear: External ear normal.   Eyes: Conjunctivae are normal. Pupils are equal, round, and reactive to light. Right eye exhibits no discharge. Left eye exhibits no discharge. No scleral icterus.   Neck: Normal range of motion.   Cardiovascular: Normal rate, regular rhythm, normal heart sounds and intact distal pulses.   No murmur heard.  Pulmonary/Chest: Effort normal and breath sounds normal. No respiratory distress. She has no wheezes.   Abdominal: Soft. Bowel sounds are normal. There is no tenderness.   Musculoskeletal: Normal range of motion. She exhibits no edema or tenderness.   Neurological: She is alert and oriented to person, place, and time. She has normal strength. No sensory deficit. She exhibits normal muscle tone.   Skin: Skin is warm. No rash noted. She is not diaphoretic.   Psychiatric: Her behavior is normal.    Disposition: Home or Self Care    Follow Up/Patient Instructions:     Medications:  Reconciled Home Medications:      Medication List      START taking these medications    famotidine 20  MG tablet  Commonly known as:  PEPCID  Take 1 tablet (20 mg total) by mouth nightly as needed for Heartburn.     magnesium oxide 400 mg (241.3 mg magnesium) tablet  Commonly known as:  MAG-OX  Take 1 tablet (400 mg total) by mouth 2 (two) times daily. - Take one tablet 2 times daily  - Can get over the counter     prazosin 2 MG Cap  Commonly known as:  MINIPRESS  Take 1 capsule (2 mg total) by mouth every evening.     sumatriptan 50 MG tablet  Commonly known as:  IMITREX  Take 1 tablet (50 mg total) by mouth every 2 (two) hours as needed for Migraine. Do not take more than 2 tablets per day   Do not take more than 3 tablets per week        CONTINUE taking these medications    ondansetron 4 MG tablet  Commonly known as:  ZOFRAN  Take 1 tablet (4 mg total) by mouth every 12 (twelve) hours as needed for Nausea.          No discharge procedures on file.    Activity: activity as tolerated  Diet: regular diet  Wound Care: keep wound clean and dry and none needed    Follow-up with Neurology in 2 weeks.    Signed:  Zainab Kaiser  2/5/2019  1:34 PM

## 2019-02-05 NOTE — PLAN OF CARE
02/05/19 1100   Discharge Assessment   Assessment Type Discharge Planning Assessment   Confirmed/corrected address and phone number on facesheet? Yes   Assessment information obtained from? Patient;Caregiver;Medical Record   Expected Length of Stay (days) 1   Communicated expected length of stay with patient/caregiver yes   Prior to hospitilization cognitive status: Alert/Oriented;No Deficits   Prior to hospitalization functional status: Independent   Current cognitive status: Alert/Oriented;No Deficits   Current Functional Status: Independent   Lives With spouse   Able to Return to Prior Arrangements yes   Is patient able to care for self after discharge? Yes   Who are your caregiver(s) and their phone number(s)? (Orestes Baires (spouse) [phone])   Patient's perception of discharge disposition home or selfcare   Patient currently being followed by outpatient case management? No   Patient currently receives any other outside agency services? No   Equipment Currently Used at Home none   Do you have any problems affording any of your prescribed medications? No   Is the patient taking medications as prescribed? yes   Does the patient have transportation home? Yes   Transportation Anticipated family or friend will provide   Does the patient receive services at the [facility]? No   Discharge Plan A Home with family   Discharge Plan B Home   DME Needed Upon Discharge  none   Patient/Family in Agreement with Plan yes

## 2019-02-05 NOTE — SUBJECTIVE & OBJECTIVE
Interval History: No acute events overnight.    Review of Systems   Constitutional: Negative for activity change, chills and fever.   HENT: Negative for ear pain and sore throat.    Eyes: Negative for photophobia, pain, redness and visual disturbance.   Respiratory: Negative for cough, chest tightness and shortness of breath.    Cardiovascular: Negative for chest pain, palpitations and leg swelling.   Gastrointestinal: Positive for nausea. Negative for abdominal distention, abdominal pain, diarrhea and vomiting.   Genitourinary: Negative for difficulty urinating and dysuria.   Musculoskeletal: Negative for arthralgias, back pain and joint swelling.   Skin: Negative for color change and pallor.   Neurological: Positive for headaches (3/10). Negative for speech difficulty (with migraines), weakness and numbness (with migraines).   Psychiatric/Behavioral: Negative for behavioral problems and confusion.     Objective:     Vital Signs (Most Recent):  Temp: 97.9 °F (36.6 °C) (02/05/19 1114)  Pulse: 99 (02/05/19 1114)  Resp: 18 (02/05/19 1114)  BP: (!) 144/81 (02/05/19 1114)  SpO2: 96 % (02/05/19 1114) Vital Signs (24h Range):  Temp:  [97.8 °F (36.6 °C)-98.5 °F (36.9 °C)] 97.9 °F (36.6 °C)  Pulse:  [73-99] 99  Resp:  [16-20] 18  SpO2:  [94 %-97 %] 96 %  BP: (101-144)/(61-93) 144/81     Weight: 116.1 kg (256 lb)  Body mass index is 40.1 kg/m².    Intake/Output Summary (Last 24 hours) at 2/5/2019 1146  Last data filed at 2/5/2019 0400  Gross per 24 hour   Intake 350 ml   Output --   Net 350 ml      Physical Exam   Constitutional: She is oriented to person, place, and time. She appears well-developed and well-nourished. No distress.   HENT:   Head: Normocephalic and atraumatic.   Right Ear: External ear normal.   Left Ear: External ear normal.   Eyes: Conjunctivae are normal. Pupils are equal, round, and reactive to light. Right eye exhibits no discharge. Left eye exhibits no discharge. No scleral icterus.   Neck: Normal  range of motion.   Cardiovascular: Normal rate, regular rhythm, normal heart sounds and intact distal pulses.   No murmur heard.  Pulmonary/Chest: Effort normal and breath sounds normal. No respiratory distress. She has no wheezes.   Abdominal: Soft. Bowel sounds are normal. There is no tenderness.   Musculoskeletal: Normal range of motion. She exhibits no edema or tenderness.   Neurological: She is alert and oriented to person, place, and time. She has normal strength. No sensory deficit. She exhibits normal muscle tone.   Skin: Skin is warm. No rash noted. She is not diaphoretic.   Psychiatric: Her behavior is normal.       Significant Labs:   CBC:   Recent Labs   Lab 02/04/19  1608   WBC 6.18   HGB 14.2   HCT 41.4        CMP:   Recent Labs   Lab 02/04/19  1608      K 4.0      CO2 23   GLU 89   BUN 12   CREATININE 0.7   CALCIUM 9.5   PROT 7.2   ALBUMIN 3.9   BILITOT 0.8   ALKPHOS 57   AST 27   ALT 53*   ANIONGAP 7*   EGFRNONAA >60.0       Significant Imaging: I have reviewed all pertinent imaging results/findings within the past 24 hours.

## 2019-02-05 NOTE — ASSESSMENT & PLAN NOTE
Greatly improved today    Recommend:  - Magnesium oxide 400mg bid for HA prevention  - Ok to cautiously start sumatriptan 50mg for abortive therapy, but no more than 2 doses/day or 3 total doses/week   If patient develops complicated migraine with hemiplegia, DC Imitrex; monitor closely given mikhail-paresthesias  Ok to DC home today from neurology perspective, with outpatient neuro f/uy in 2-4 weeks

## 2019-02-05 NOTE — ASSESSMENT & PLAN NOTE
With associated nightmares    Sleep hygiene:  - Avoid afternoon caffeine consumption; limit evening fluid intake  - Attempt to go to bed at a regular time every night  - Avoid electronics in the bedroom (tv, computer, cellphone)  - If difficulty falling asleep after 20 mins, get up and move to a different room, e.g. to read, and then come back to bed    Prazosin 1mg qhs x2-3 days, then increase to 2mg qhs - for PTSD-associated nightmares/sleep disorder  Outpatient f/u

## 2019-02-05 NOTE — PLAN OF CARE
Patient to discharge home in care of family. Will schedule her own PCP appointment due to travel plans. Contacted Dr. La office for Neurology F/U. Staff will contact patient directly to make appointment.     02/05/19 1518   Final Note   Assessment Type Final Discharge Note   Anticipated Discharge Disposition Home   What phone number can be called within the next 1-3 days to see how you are doing after discharge? ([phone])   Hospital Follow Up  Appt(s) scheduled? Yes   Discharge plans and expectations educations in teach back method with documentation complete? Yes   Right Care Referral Info   Post Acute Recommendation No Care

## 2019-02-05 NOTE — SUBJECTIVE & OBJECTIVE
Past Medical History:   Diagnosis Date    Anxiety     Asthma     Colon polyps 2016    Depression     Migraine with aura, not intractable 1/8/2019    PTSD (post-traumatic stress disorder)        Past Surgical History:   Procedure Laterality Date    colonpolyp removal         Review of patient's allergies indicates:   Allergen Reactions    Penicillins Hives       Current Neurological Medications: Solumedrol, Depakote    No current facility-administered medications on file prior to encounter.      Current Outpatient Medications on File Prior to Encounter   Medication Sig    ondansetron (ZOFRAN) 4 MG tablet Take 1 tablet (4 mg total) by mouth every 12 (twelve) hours as needed for Nausea.     Family History     None        Tobacco Use    Smoking status: Never Smoker   Substance and Sexual Activity    Alcohol use: No     Frequency: Never    Drug use: No    Sexual activity: No     Review of Systems   Eyes: Positive for photophobia and visual disturbance.   Respiratory: Positive for cough. Negative for shortness of breath.    Cardiovascular: Positive for palpitations. Negative for chest pain.   Gastrointestinal: Positive for abdominal pain, constipation and vomiting.   Genitourinary: Negative for frequency and urgency.   Skin: Negative for rash and wound.   Neurological: Positive for facial asymmetry (intermittent ?R facial droop associated with severe HA), speech difficulty (intermittent; describes troubble getting words out), numbness and headaches.   Psychiatric/Behavioral: Positive for confusion (intermittent).     Objective:     Vital Signs (Most Recent):  Temp: 97.9 °F (36.6 °C) (02/05/19 1114)  Pulse: 99 (02/05/19 1114)  Resp: 18 (02/05/19 1114)  BP: (!) 144/81 (02/05/19 1114)  SpO2: 96 % (02/05/19 1114) Vital Signs (24h Range):  Temp:  [97.8 °F (36.6 °C)-98.5 °F (36.9 °C)] 97.9 °F (36.6 °C)  Pulse:  [73-99] 99  Resp:  [16-20] 18  SpO2:  [94 %-97 %] 96 %  BP: (101-144)/(61-93) 144/81     Weight: 116.1 kg  (256 lb)  Body mass index is 40.1 kg/m².    Physical Exam   Constitutional: She is oriented to person, place, and time. She appears well-developed and well-nourished. No distress.   HENT:   Head: Normocephalic and atraumatic.   Eyes: EOM are normal.   Pulmonary/Chest: Effort normal.   Musculoskeletal: Normal range of motion.   Neurological: She is alert and oriented to person, place, and time. She has a normal Finger-Nose-Finger Test and a normal Heel to Mcdonald Test.   Skin: Skin is warm and dry. She is not diaphoretic.   Psychiatric: Her speech is normal. Judgment normal.   Nursing note and vitals reviewed.      NEUROLOGICAL EXAMINATION:     MENTAL STATUS   Oriented to person, place, and time.   Attention: normal. Concentration: normal.   Speech: speech is normal   Level of consciousness: alert    CRANIAL NERVES     CN II   Visual fields full to confrontation.     CN III, IV, VI   Extraocular motions are normal.   Nystagmus: none     CN V   Facial sensation intact.     CN VII   Facial expression full, symmetric.     CN VIII   Hearing: intact    CN XI   CN XI normal.     CN XII   CN XII normal.     MOTOR EXAM   Muscle bulk: normal  Overall muscle tone: normal    Strength   Right biceps: 5/5  Left biceps: 5/5  Right triceps: 5/5  Left triceps: 5/5  Right quadriceps: 5/5  Left quadriceps: 5/5  Right hamstrin/5  Left hamstrin/5    SENSORY EXAM   Light touch normal.     GAIT AND COORDINATION      Coordination   Finger to nose coordination: normal  Heel to shin coordination: normal    Tremor   Resting tremor: absent  Intention tremor: absent  Action tremor: absent      Significant Labs: All pertinent lab results from the past 24 hours have been reviewed.    Significant Imaging: I have reviewed and interpreted all pertinent imaging results/findings within the past 24 hours.

## 2019-02-05 NOTE — ASSESSMENT & PLAN NOTE
Patient with intractable migraines associated with paralysis, admitted directly her neurologist Dr. Mary  Per Dr. Mary reccs  · IV Depakote 500 mg IV daily for 3 days   · IV Solumedrol 1 gram IV daily for 3 days  · IV Toradol 30 mg every 6 hours x 3 days  · Zofran prn for nausea  · Benadryl 25mg Q6H prn  - Holding Toradol as patient reports colitis and cannot receive NSAID. Follow with Neurology.  - RPT mk a.m  - Neurochecks Q4H  - Current headache 3/10, will continue to monitor.

## 2019-02-05 NOTE — HPI
"Ms. Yi is a 29 yo F with anxiety, depression, and complex PTSD who presented as a direct admit from the neurology clinic (Dr. Mary) for treatment of severe headaches.  She describes multiple headache types, all of which began January 5, 2019.  Prior to last month, she had had occasional mild headaches, and some "migraines," but no headaches like the ones she is currently experiencing.  She has been having headaches every day for a month, but finds it hard to tease out what symptoms are associated with which headaches.  She gets some that are like little "dots" over her forehead, some that are pain behind both her eyes, in her nose, and on the roof of her mouth, and some that are more vague and generalized.  The worst headaches are the middle type, which occur approx 5x/week, and are associated with severe photophobia, mild phonophobia, nausea, and vomiting.  However, she has been having some nausea and vomiting at other times, as well.  She also gets symptoms of numbness and tingling "like Novocaine" to her face (bilateral forehead, roof of mouth, cheek) or entire arm.  There is no laterality to the paresthesias.  Sometimes, it effects the RUE, sometimes the lef; however, it often effects the entire face.  Occasionally, she has some blurred vision, which starts more peripherally, and then the blurriness comes more centrally and effects her entire vision.  Around her vision, she sees halos.  Within her vision, she sees white spots.  Per her report, it is unclear whether she ever has actual weakness associated with her symptoms; however, she was previously worked up for stroke (imaging wnl), with some concern for prior R facial droop and R hand weakness.  She occasionally experiences a "bad taste" in her mouth.  She also reports cognitive slowing, poor sleep, and significant forgetfulness.  She is currently experiencing minimal headache only; she is s/p 2 doses of Solumedrol 1g and Depakote 500mg - each " of which were given last night, and then again this morning.

## 2019-02-05 NOTE — PLAN OF CARE
Problem: Adult Inpatient Plan of Care  Goal: Plan of Care Review  Outcome: Ongoing (interventions implemented as appropriate)  Pt discharge instructions given to pt.  Pt verbalized understanding.  Printed prescription with pt.  All belongings with pt.  PIV removed.  Pt to transport self home via ride share.

## 2019-02-05 NOTE — HOSPITAL COURSE
Admitted to hospital medicine for IV cocktail for intractable migraines. S/p Depakote 500mg IV, Solumedrol 1g, IV per Dr. Herman's reccs. Supposed to receive IV Toradolol but pt reports hx of colitis and cannot receive NSAIDs. Reports improved headache to 3/10.

## 2019-02-06 ENCOUNTER — TELEPHONE (OUTPATIENT)
Dept: NEUROLOGY | Facility: CLINIC | Age: 29
End: 2019-02-06

## 2019-02-06 NOTE — TELEPHONE ENCOUNTER
----- Message from [place]ro Lindo sent at 2/5/2019  3:15 PM CST -----  Needs Advice    Reason for call: Pt is calling to schedule hospital f/u appt. Pt last saw Dr. Mary on 01/08/19, but were prefer to f/u w/ Dr. La, due to the doctor treating pt while she was in the hospital.        Communication Preference: [phone]    Additional Information:

## 2019-02-06 NOTE — TELEPHONE ENCOUNTER
Left a message for the patient to let her know an appointment is scheduled for her on 4/3 at 1:00. She was added to the wait list and asked to call back to reschedule if this is not a good day and time for her.

## 2019-02-07 ENCOUNTER — TELEPHONE (OUTPATIENT)
Dept: NEUROLOGY | Facility: CLINIC | Age: 29
End: 2019-02-07

## 2019-02-07 ENCOUNTER — PATIENT OUTREACH (OUTPATIENT)
Dept: ADMINISTRATIVE | Facility: CLINIC | Age: 29
End: 2019-02-07

## 2019-02-07 NOTE — TELEPHONE ENCOUNTER
----- Message from Molly Sahni, RN sent at 2/7/2019  5:11 PM CST -----  Patient reports out of Zofran, please contact and advise. Thank you for your time, I remain    Respectfully,  Molly Sahni, BSN,RN, CCRN   [facility]  Post-Acute Discharge  [phone]

## 2019-02-07 NOTE — PATIENT INSTRUCTIONS

## 2019-02-07 NOTE — PROGRESS NOTES
[facility] Discharge script completed with patient reports no migraines since Monday. Reports out of [employer], message sent to Dr Mary.

## 2019-02-07 NOTE — TELEPHONE ENCOUNTER
----- Message from Farnaz Parks sent at 2/7/2019  2:06 PM CST -----  Contact: Dr. Veras ([employer]) @ [phone]  Calling to do a peer to peer with Dr. Mary regarding patient. Please call.

## 2019-02-08 NOTE — TELEPHONE ENCOUNTER
Nothing to do from what I can see. Pharmacy D/C'd the zofran on 2/5 and another MD started her on pepcid instead.

## 2019-02-18 ENCOUNTER — TELEPHONE (OUTPATIENT)
Dept: NEUROLOGY | Facility: CLINIC | Age: 29
End: 2019-02-18

## 2019-02-18 NOTE — TELEPHONE ENCOUNTER
Spoke with patient on information regarding getting approved and provided fax number for insurance company. Patient also stated she will be going on tour so she will get care else where starting March 1st.

## 2019-02-18 NOTE — TELEPHONE ENCOUNTER
----- Message from Farnaz Parks sent at 2/18/2019 10:27 AM CST -----  Contact: pt @ [phone]  Calling to speak with someone in Dr. Mary's office regarding her treatment, says that it was done inpatient, now her insurance is not willing to cover without a reason as to why it was done inpatient instead of an out patient procedure. Please call.

## 2021-12-26 ENCOUNTER — IMMUNIZATION (OUTPATIENT)
Dept: PRIMARY CARE CLINIC | Facility: CLINIC | Age: 31
End: 2021-12-26
Payer: COMMERCIAL

## 2021-12-26 DIAGNOSIS — Z23 NEED FOR VACCINATION: Primary | ICD-10-CM

## 2021-12-26 PROCEDURE — 0064A COVID-19, MRNA, LNP-S, PF, 100 MCG/0.25 ML DOSE VACCINE (MODERNA BOOSTER): CPT | Mod: CV19,PBBFAC | Performed by: INTERNAL MEDICINE

## 2025-06-02 ENCOUNTER — LAB VISIT (OUTPATIENT)
Dept: LAB | Facility: HOSPITAL | Age: 35
End: 2025-06-02
Payer: COMMERCIAL

## 2025-06-02 DIAGNOSIS — R19.7 DIARRHEA OF PRESUMED INFECTIOUS ORIGIN: ICD-10-CM

## 2025-06-02 DIAGNOSIS — R19.7 DIARRHEA OF PRESUMED INFECTIOUS ORIGIN: Primary | ICD-10-CM

## 2025-06-02 PROCEDURE — 87324 CLOSTRIDIUM AG IA: CPT

## 2025-06-02 PROCEDURE — 89055 LEUKOCYTE ASSESSMENT FECAL: CPT

## 2025-06-02 PROCEDURE — 87046 STOOL CULTR AEROBIC BACT EA: CPT

## 2025-06-02 PROCEDURE — 87338 HPYLORI STOOL AG IA: CPT

## 2025-06-02 PROCEDURE — 87045 FECES CULTURE AEROBIC BACT: CPT

## 2025-06-02 PROCEDURE — 87427 SHIGA-LIKE TOXIN AG IA: CPT

## 2025-06-03 LAB
C COLI+JEJ+UPSA DNA STL QL NAA+NON-PROBE: NEGATIVE
C DIFF GDH STL QL: NEGATIVE
C DIFF TOX A+B STL QL IA: NEGATIVE
H. PYLORI SURFACE ANTIGEN, INTERPRETATION (OHS): NEGATIVE
HELICOBACTER PYLORI SURFACE ANTIGEN (OHS): 0.12
WBC #/AREA STL HPF: NORMAL /[HPF]

## 2025-06-04 LAB
E COLI SXT1 STL QL IA: NEGATIVE
E COLI SXT2 STL QL IA: NEGATIVE

## 2025-06-05 LAB — BACTERIA STL CULT: NORMAL
